# Patient Record
Sex: MALE | Race: ASIAN | NOT HISPANIC OR LATINO | Employment: OTHER | URBAN - METROPOLITAN AREA
[De-identification: names, ages, dates, MRNs, and addresses within clinical notes are randomized per-mention and may not be internally consistent; named-entity substitution may affect disease eponyms.]

---

## 2017-09-08 ENCOUNTER — TELEPHONE (OUTPATIENT)
Dept: GASTROENTEROLOGY | Facility: CLINIC | Age: 72
End: 2017-09-08

## 2017-10-31 NOTE — TELEPHONE ENCOUNTER
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 If yes, list:    Patient Contact (Y/N) & Call Details: No referred    Action: Faxed cover sheet      OUTSIDE RECORDS CHECKLIST     CLINIC NAME COMMENTS REC (x) IMG (x)   [facility]

## 2017-11-01 ENCOUNTER — PRE VISIT (OUTPATIENT)
Dept: NEUROLOGY | Facility: CLINIC | Age: 72
End: 2017-11-01

## 2017-11-01 ENCOUNTER — OFFICE VISIT (OUTPATIENT)
Dept: NEUROLOGY | Facility: CLINIC | Age: 72
End: 2017-11-01

## 2017-11-01 VITALS
HEART RATE: 100 BPM | WEIGHT: 133 LBS | HEIGHT: 60 IN | SYSTOLIC BLOOD PRESSURE: 125 MMHG | BODY MASS INDEX: 26.11 KG/M2 | DIASTOLIC BLOOD PRESSURE: 71 MMHG

## 2017-11-01 DIAGNOSIS — M48.062 SPINAL STENOSIS OF LUMBAR REGION WITH NEUROGENIC CLAUDICATION: Primary | ICD-10-CM

## 2017-11-01 RX ORDER — SENNOSIDES 8.6 MG
1 TABLET ORAL DAILY
COMMUNITY

## 2017-11-01 ASSESSMENT — PAIN SCALES - GENERAL: PAINLEVEL: MODERATE PAIN (4)

## 2017-11-01 NOTE — MR AVS SNAPSHOT
After Visit Summary   11/1/2017    Layla Alcaraz    MRN: [id number]           Patient Information     Date Of Birth          1945        Visit Information        Provider Department      11/1/2017 2:00 PM Genna Stein; Germania Mancuso MD [facility] Neurology        Today's Diagnoses     Spinal stenosis of lumbar region with neurogenic claudication    -  1      Care Instructions    Schedule EMG, Physiatry and Neurosurgery appointments.    Continue gabapentin as prescribed by PCP.    Follow up in Neurology as needed.          Follow-ups after your visit        Additional Services     NEUROSURGERY REFERRAL       Your provider has referred you to: [facility], [place]: Neurosurgery Clinic [facility] [phone]   [email or web address]    Please be aware that coverage of these services is subject to the terms and limitations of your health insurance plan.  Call member services at your health plan with any benefit or coverage questions.      Please bring the following with you to your appointment:    (1) Any X-Rays, CTs or MRIs which have been performed.  Contact the facility where they were done to arrange for  prior to your scheduled appointment.   (2) List of current medications  (3) This referral request   (4) Any documents/labs given to you for this referral            PHYSIATRY REFERRAL       Your provider has referred you to: [facility] [place]: Physical Medicine and Rehabilitation [facility] [phone]   [email or web address]     Please be aware that coverage of these services is subject to the terms and limitations of your health insurance plan.  Call member services at your health plan with any benefit or coverage questions.      Please bring the following to your appointment:  >>   Any x-rays, CTs or MRIs which have been performed.  Contact the facility where they were done to arrange for  prior to your scheduled appointment.    >>   List  of current medications   >>   This referral request   >>   Any documents/labs given to you for this referral                  Your next 10 appointments already scheduled     Dec 05, 2017  2:30 PM CST   (Arrive by 2:15 PM)   EMG with Joseph Melissa MD   [facility] EMG ([facility])    [address] [place] [address] [phone]           Do not use lotions or creams on the area to be tested. If you are on blood thinners (Warfarin, Coumadin, Lovenox, etc), please contact your primary care physician to check if it is safe to stop them 3 days prior to testing. If you have anxiety, please check with your referring physician to obtain anti-anxiety medication for the procedure.            Dec 11, 2017  1:30 PM CST   (Arrive by 1:15 PM)   New Patient Visit with Deepika Carlson PA-C   [facility] Neurosurgery ([facility])    [address] [place] [address] [phone]              Future tests that were ordered for you today     Open Future Orders        Priority Expected Expires Ordered    EMG Routine  11/1/2018 11/1/2017            Who to contact     Please call your clinic at [phone] to:    Ask questions about your health    Make or cancel appointments    Discuss your medicines    Learn about your test results    Speak to your doctor   If you have compliments or concerns about an experience at your clinic, or if you wish to file a complaint, please contact [facility] Patient Relations at [phone] or email us at [email or web address]         Additional Information About Your Visit        [employer] Information     [employer] is an electronic gateway that provides easy, online access to your medical records. With [employer], you can request a clinic appointment, read your test results, renew a prescription or communicate with your care team.     To sign up for [employer]  "visit the website at [email or web address]   You will be asked to enter the access code listed below, as well as some personal information. Please follow the directions to create your username and password.     Your access code is: [email or web address]  Expires: 2017  6:30 AM     Your access code will  in 90 days. If you need help or a new code, please contact your [facility] or call [phone] for assistance.        Care EveryWhere ID     This is your Care EveryWhere ID. This could be used by other organizations to access your [place] medical records  [id number]        Your Vitals Were     Pulse Height BMI (Body Mass Index)             100 1.499 m (4' 11\") 26.86 kg/m2          Blood Pressure from Last 3 Encounters:   17 125/71    Weight from Last 3 Encounters:   17 60.3 kg (133 lb)   10/04/16 61.7 kg (136 lb)              We Performed the Following     NEUROSURGERY REFERRAL     PHYSIATRY REFERRAL        Primary Care Provider Office Phone # Fax #    Emani Torres [phone]       [facility]  [facility] [address]        Equal Access to Services     CISCO LEONG : Hadii aad ku hadasho Soomaali, waaxda luqadaha, qaybta kaalmada adeegyada, waxay idiin haykimberlyn luisana guerrier. So [facility] [phone].    ATENCIÓN: Si habla español, tiene a marinelli disposición servicios gratuitos de asistencia lingüística. Llame al [phone].    We comply with applicable federal civil rights laws and [place] laws. We do not discriminate on the basis of race, color, national origin, age, disability, sex, sexual orientation, or gender identity.            Thank you!     Thank you for choosing [facility] NEUROLOGY  for your care. Our goal is always to provide you with excellent care. Hearing back from our patients is one way we can continue to improve our services. Please take a few minutes to complete the written survey " that you may receive in the mail after your visit with us. Thank you!             Your Updated Medication List - Protect others around you: Learn how to safely use, store and throw away your medicines at www.disposemymeds.org.          This list is accurate as of: 11/1/17  3:59 PM.  Always use your most recent med list.                   Brand Name Dispense Instructions for use Diagnosis    ASPIRIN PO      Take 81 mg by mouth        calcium-vitamin D 600-400 MG-UNIT per tablet    CALTRATE     Take 1 tablet by mouth 2 times daily        FLUOXETINE HCL PO      Take 40 mg by mouth daily        GABAPENTIN PO      Take 800 mg by mouth 4 times daily        PRAVASTATIN SODIUM PO           sennosides 8.6 MG tablet    SENOKOT     Take 1 tablet by mouth daily
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
## 2017-11-01 NOTE — PATIENT INSTRUCTIONS
Schedule EMG, Physiatry and Neurosurgery appointments.    Continue gabapentin as prescribed by PCP.    Follow up in Neurology as needed.

## 2017-11-01 NOTE — NURSING NOTE
Chief Complaint   Patient presents with     Consult     Paralysis in arms and legs    Tosha Carranza, TONG

## 2017-12-05 ENCOUNTER — OFFICE VISIT (OUTPATIENT)
Dept: NEUROLOGY | Facility: CLINIC | Age: 72
End: 2017-12-05
Attending: PSYCHIATRY & NEUROLOGY

## 2017-12-05 DIAGNOSIS — M54.17 LUMBOSACRAL RADICULOPATHY AT L5: Primary | ICD-10-CM

## 2017-12-05 DIAGNOSIS — M48.062 SPINAL STENOSIS OF LUMBAR REGION WITH NEUROGENIC CLAUDICATION: ICD-10-CM

## 2017-12-05 NOTE — PROGRESS NOTES
[facility]  Electrodiagnostic Laboratory    Nerve Conduction & EMG Report          Patient: Layla Alcaraz  Patient ID: [id number]  Gender: Male  YOB: 1945  Age: 72 Years 6 Months      Referring Physician: Daniel Wilks MD  CC: Germania Mancuso MD    History & Examination:    72 year old man with chief complaint of pain radiating from the left buttock and hip to the lateral calf and dorsum of the foot. He has an occasional burning sensation at the left foot, but no complaints on the right. Query left lumbosacral radiculopathy or other neuromuscular explanation for the chief complaint.     Techniques: Motor and sensory conduction studies were done with surface recording electrodes. Temperature was monitored and recorded throughout the study. Lower extremities were maintained at a temperature of 31degrees Centigrade or higher. EMG was done with a concentric needle electrode.     Results:    Left sural and superficial peroneal antidromic sensory NCSs were normal. Left deep peroneal and tibial motor NCSs were normal. Left tibial F-wave latencies were normal. EMG of left tibialis anterior, medial gastrocnemius, vastus lateralis, biceps femoris (short head) and gluteus medius was normal. EMG of left L5 paraspinals showed 2+ fibs/PSWs.    Interpretation:    Abnormal study. There was abnormal spontaneous activity at the left lumbar paraspinals. This finding suggests left lumbosacral radiculopathy in the appropriate clinical context, but a) it is not sufficient to make this electrodiagnosis, when isolated, b) it does not allow accurate localization of the level affected, and c) it is not specific to radiculopathy, as similar findings can be seen in axial myopathy, local trauma, or even asymptomatic, healthy individuals over the age of 60. Clinical correlation is required.     EMG Physician:    Joseph Melissa MD        Sensory NCS      Nerve / Sites Rec. Site Onset Peak NP Amp Ref. PP Amp  Dist Mao Ref. Temp     ms ms  V  V  V cm m/s m/s  C   L SURAL - Lat Mall 60      Calf Ankle 3.02 3.85 12.3 5.0 13.5 14 46.3 38.0 30.7   L SUP PERONEAL      Lat Leg Austin 2.92 3.65 8.3  9.2 12.5 42.9 38.0 31.1       Motor NCS      Nerve / Sites Rec. Site Lat Ref. Amp Ref. Rel Amp Dist Mao Ref. Dur. Area Temp.     ms ms mV mV % cm m/s m/s ms %  C   L DEEP PERONEAL - EDB 60      Ankle EDB 5.26 6.00 4.3 2.0 100 8   6.93 100 31      FibHead EDB 10.78  4.0  94.6 24 43.5 38.0 7.24 90.3 31      Pop Fos EDB 12.45  4.1  96.1 8 48.0 38.0 6.93 93.1 31   L TIBIAL - AH      Ankle AH 3.91 6.00 16.3 4.0 100 8   6.30 100 31      Pop Fos AH 10.83  13.3  81.6 33 47.6 38.0 7.03 94 31       F  Wave      Nerve Min F Lat Max F Lat Mean FLat Temp.    ms ms ms  C   L TIBIAL 41.56 48.91 46.91 31       EMG Summary Table     Spontaneous MUAP Recruitment    IA Fib Fasc H.F. Amp Dur. PPP Pattern   L. TIB ANTERIOR Normal None None None N N None Normal   L. GASTROCN (MED) Normal None None None N N None Normal   L. VAST LATERALIS Normal None None None N N None Normal   L. BIC FEM (S HEAD) Normal None None None N N None Normal   L. GLUTEUS MED Normal None None None N N None Normal   L. L5 PSP Normal 2+ None None N N None Normal

## 2017-12-05 NOTE — MR AVS SNAPSHOT
After Visit Summary   12/5/2017    Layla Alcaraz    MRN: [id number]           Patient Information     Date Of Birth          1945        Visit Information        Provider Department      12/5/2017 2:15 PM Joseph Melissa MD; MULTILINGUAL WORD [facility] EMG        Today's Diagnoses     Lumbosacral radiculopathy at L5    -  1    Spinal stenosis of lumbar region with neurogenic claudication           Follow-ups after your visit        Your next 10 appointments already scheduled     Dec 11, 2017  1:30 PM CST   (Arrive by 1:15 PM)   New Patient Visit with Deepika Carlson PA-C   [facility] Neurosurgery ([facility])    [address] [place] [address]  3rd [facility] [address] [phone]              Future tests that were ordered for you today     Open Future Orders        Priority Expected Expires Ordered    Needle EMG Each Extremity w/Paraspinal Area Complete (71734) Routine  12/5/2018 12/5/2017            Who to contact     Please call your clinic at [phone] to:    Ask questions about your health    Make or cancel appointments    Discuss your medicines    Learn about your test results    Speak to your doctor   If you have compliments or concerns about an experience at your clinic, or if you wish to file a complaint, please contact [facility] Patient Relations at [phone] or email us at [email or web address]         Additional Information About Your Visit        MyChart Information     [employer] is an electronic gateway that provides easy, online access to your medical records. With [employer], you can request a clinic appointment, read your test results, renew a prescription or communicate with your care team.     To sign up for [employer] visit the website at [email or web address]   You will be asked to enter the access code listed below, as well as some personal information. Please follow the directions  to create your username and password.     Your access code is: [email or web address]  Expires: 2017  5:30 AM     Your access code will  in 90 days. If you need help or a new code, please contact your [facility] or call [phone] for assistance.        Care EveryWhere ID     This is your Care EveryWhere ID. This could be used by other organizations to access your [place] medical records  [id number]         Blood Pressure from Last 3 Encounters:   17 125/71    Weight from Last 3 Encounters:   17 60.3 kg (133 lb)   10/04/16 61.7 kg (136 lb)              We Performed the Following     EMG     NCS Motor with or without F-Wave, 3-4 nerves (61080)        Primary Care Provider Office Phone # Fax #    Emani Torres [phone]       [facility]  [facility] [address]        Equal Access to Services     CISCO LEONG : Hadii aad ku hadasho Soomaali, waaxda luqadaha, qaybta kaalmada adeegyada, waxay idiin hayaan adeeg kharaanny drew . So [facility] [phone].    ATENCIÓN: Si habla español, tiene a marinelli disposición servicios gratuitos de asistencia lingüística. Ll[facility] [phone].    We comply with applicable federal civil rights laws and [place] laws. We do not discriminate on the basis of race, color, national origin, age, disability, sex, sexual orientation, or gender identity.            Thank you!     Thank you for choosing [facility]  for your care. Our goal is always to provide you with excellent care. Hearing back from our patients is one way we can continue to improve our services. Please take a few minutes to complete the written survey that you may receive in the mail after your visit with us. Thank you!             Your Updated Medication List - Protect others around you: Learn how to safely use, store and throw away your medicines at www.disposemymeds.org.          This list is accurate as of: 17   3:04 PM.  Always use your most recent med list.                   Brand Name Dispense Instructions for use Diagnosis    ASPIRIN PO      Take 81 mg by mouth        calcium-vitamin D 600-400 MG-UNIT per tablet    CALTRATE     Take 1 tablet by mouth 2 times daily        FLUOXETINE HCL PO      Take 40 mg by mouth daily        GABAPENTIN PO      Take 800 mg by mouth 4 times daily        PRAVASTATIN SODIUM PO           sennosides 8.6 MG tablet    SENOKOT     Take 1 tablet by mouth daily

## 2017-12-05 NOTE — LETTER
12/5/2017       RE: Layla Alcaraz  [address]        [facility] [place] [address]     Dear Colleague,    Thank you for referring your patient, Layla Alcaraz, to the [facility] EMG at [facility]. Please see a copy of my visit note below.        [facility]  Electrodiagnostic Laboratory    Nerve Conduction & EMG Report          Patient: Layla Alcaraz  Patient ID: [id number]  Gender: Male  YOB: 1945  Age: 72 Years 6 Months      Referring Physician: Daniel Wilks MD  CC: Germania Mancuso MD    History & Examination:    72 year old man with chief complaint of pain radiating from the left buttock and hip to the lateral calf and dorsum of the foot. He has an occasional burning sensation at the left foot, but no complaints on the right. Query left lumbosacral radiculopathy or other neuromuscular explanation for the chief complaint.     Techniques: Motor and sensory conduction studies were done with surface recording electrodes. Temperature was monitored and recorded throughout the study. Lower extremities were maintained at a temperature of 31degrees Centigrade or higher. EMG was done with a concentric needle electrode.     Results:    Left sural and superficial peroneal antidromic sensory NCSs were normal. Left deep peroneal and tibial motor NCSs were normal. Left tibial F-wave latencies were normal. EMG of left tibialis anterior, medial gastrocnemius, vastus lateralis, biceps femoris (short head) and gluteus medius was normal. EMG of left L5 paraspinals showed 2+ fibs/PSWs.    Interpretation:    Abnormal study. There was abnormal spontaneous activity at the left lumbar paraspinals. This finding suggests left lumbosacral radiculopathy in the appropriate clinical context, but a) it is not sufficient to make this electrodiagnosis, when isolated, b) it does not allow accurate localization of the level affected, and c) it is not specific  to radiculopathy, as similar findings can be seen in axial myopathy, local trauma, or even asymptomatic, healthy individuals over the age of 60. Clinical correlation is required.     EMG Physician:    Joseph Meilssa MD        Sensory NCS      Nerve / Sites Rec. Site Onset Peak NP Amp Ref. PP Amp Dist Mao Ref. Temp     ms ms  V  V  V cm m/s m/s  C   L SURAL - Lat Mall 60      Calf Ankle 3.02 3.85 12.3 5.0 13.5 14 46.3 38.0 30.7   L SUP PERONEAL      Lat Leg Austin 2.92 3.65 8.3  9.2 12.5 42.9 38.0 31.1       Motor NCS      Nerve / Sites Rec. Site Lat Ref. Amp Ref. Rel Amp Dist Mao Ref. Dur. Area Temp.     ms ms mV mV % cm m/s m/s ms %  C   L DEEP PERONEAL - EDB 60      Ankle EDB 5.26 6.00 4.3 2.0 100 8   6.93 100 31      FibHead EDB 10.78  4.0  94.6 24 43.5 38.0 7.24 90.3 31      Pop Fos EDB 12.45  4.1  96.1 8 48.0 38.0 6.93 93.1 31   L TIBIAL - AH      Ankle AH 3.91 6.00 16.3 4.0 100 8   6.30 100 31      Pop Fos AH 10.83  13.3  81.6 33 47.6 38.0 7.03 94 31       F  Wave      Nerve Min F Lat Max F Lat Mean FLat Temp.    ms ms ms  C   L TIBIAL 41.56 48.91 46.91 31       EMG Summary Table     Spontaneous MUAP Recruitment    IA Fib Fasc H.F. Amp Dur. PPP Pattern   L. TIB ANTERIOR Normal None None None N N None Normal   L. GASTROCN (MED) Normal None None None N N None Normal   L. VAST LATERALIS Normal None None None N N None Normal   L. BIC FEM (S HEAD) Normal None None None N N None Normal   L. GLUTEUS MED Normal None None None N N None Normal   L. L5 PSP Normal 2+ None None N N None Normal                      Again, thank you for allowing me to participate in the care of your patient.      Sincerely,    Joseph Melissa MD

## 2017-12-11 ENCOUNTER — OFFICE VISIT (OUTPATIENT)
Dept: NEUROSURGERY | Facility: CLINIC | Age: 72
End: 2017-12-11
Attending: PSYCHIATRY & NEUROLOGY

## 2017-12-11 VITALS
HEIGHT: 60 IN | HEART RATE: 87 BPM | DIASTOLIC BLOOD PRESSURE: 74 MMHG | BODY MASS INDEX: 27.68 KG/M2 | SYSTOLIC BLOOD PRESSURE: 136 MMHG | WEIGHT: 141 LBS

## 2017-12-11 DIAGNOSIS — M47.812 SPONDYLOSIS OF CERVICAL REGION WITHOUT MYELOPATHY OR RADICULOPATHY: ICD-10-CM

## 2017-12-11 DIAGNOSIS — M47.816 LUMBAR SPONDYLOSIS: Primary | ICD-10-CM

## 2017-12-11 DIAGNOSIS — M47.814 THORACIC SPONDYLOSIS WITHOUT MYELOPATHY: ICD-10-CM

## 2017-12-11 ASSESSMENT — ENCOUNTER SYMPTOMS
NECK PAIN: 1
MUSCLE WEAKNESS: 0
STIFFNESS: 0
ARTHRALGIAS: 0
MYALGIAS: 1
BACK PAIN: 1
JOINT SWELLING: 0
MUSCLE CRAMPS: 0

## 2017-12-11 ASSESSMENT — PAIN SCALES - GENERAL: PAINLEVEL: NO PAIN (0)

## 2017-12-11 NOTE — LETTER
12/11/2017       RE: Layla Alcaraz  [address]        [facility] [address]     Dear Colleague,    Thank you for referring your patient, Layla Alcaraz, to the [facility] NEUROSURGERY at [facility]. Please see a copy of my visit note below.      Neurosurgery Clinic Consult  Date of Visit: 12/11/2017  Referred for: Left leg pain  Referring Provider:  Efe Sanchez MD      Dear Dr. Wilks    We were happy to see Layla Alcaraz, a pleasant 72 year old year old male for neck pain, low back pain, left leg pain.    HPI: This nice gentleman has had pain in his back, left leg and neck for years. In fact in 2010 he was evaluated even with an EMG for paresthesias and sensory changes in the left lower extremity. This EMG was negative. He's had some minor treatments over the years, but nothing within the last year or so with the exception of an updated MRI. He was referred to neurology initially because she had complaints of both left arm and left leg symptoms, but by the time he saw neurology his left arm symptoms were resolved. Currently he has back pain and left leg pain worse when he's sitting or lying down, particularly when lying on his side, but seemed to be better if he is up walking around. It is rather severe at times. He has been on gabapentin for greater than 2 years and doesn't notice that it makes too much difference. He thinks that helps a little even though he is on near maximum dose, about 3200 mg per day. He doesn't have any weakness, the pain in his leg is described as burning, or hot, but not numb. The pain in the left leg radiates from the buttock, posterolateral thigh and calf to the dorsum of the foot. Approximately an L5 distribution. He has axial neck pain, worse when he is moving around, no arm symptoms.    When he was seen by neurology he was found to have a little weakness in the left deltoid and psoas. Simultaneous PMR and  neurosurgery consults were ordered. Apparently his primary care clinic sets up his appointments for him. The neurosurgery appointment was set up before PMR for management of his symptoms, he is not interested in surgery if it can possibly be avoided. A treatment history outline is included below    Treatment history: (Within the last year)   SURGERIES: no  PT: No  ORAL STEROIDS: No  INJECTIONS: Last one was around the left knee helped for 2-3 years. Never had any in the spine.  PERTINENT MEDS:   Gabapentin, takes it t.i.d., usually eases his pain for about 2 hours after each dose.  NICOTINE:   no  PAIN CONTRACT:  No     Patient Supplied Answers To the [facility] Pain Questionnaire  [facility] Pain -  Patient Entered Questionnaire/Answers 12/11/2017   What number best describes your pain right now:  0 = No pain  to  10 = Worst pain imaginable 6   How would you describe the pain? burning, sharp   Which of the following worsen your pain? lying down, sitting   Which of the following improve or reduce your pain?  medication   What number best describes your average pain for the past week:  0 = No pain  to  10 = Worst pain imaginable 6   What number best describes your LOWEST pain in past 24 hours:  0 = No pain  to  10 = Worst pain imaginable 4   What number best describes your WORST pain in past 24 hours:  0 = No pain  to  10 = Worst pain imaginable 10   When is your pain worst? Night   Have you tried treating your pain with medication?  Yes   Are you currently taking medications for your pain? Yes   *  PAIN: Location:  radiating from the left buttock and hip lateral left thigh to the lateral calf and dorsum of the foot and burning sensation at the left foot.  Exacerbated by:  Laying down or sitting. Can walk as far as he likes  Alleviated by:  Gabapentin  *  NUMBNESS: Location:  No  *  WEAKNESS:  No weakness   *  BOWEL/BLADDER FUNCTION:  Intact.    *  OTHER SYMPTOMS:  None     Current Outpatient Prescriptions:      sennosides (SENOKOT)  "8.6 MG tablet, Take 1 tablet by mouth daily, Disp: , Rfl:      PRAVASTATIN SODIUM PO, , Disp: , Rfl:      calcium-vitamin D (CALTRATE) 600-400 MG-UNIT per tablet, Take 1 tablet by mouth 2 times daily, Disp: , Rfl:      FLUOXETINE HCL PO, Take 40 mg by mouth daily, Disp: , Rfl:      GABAPENTIN PO, Take 800 mg by mouth 4 times daily , Disp: , Rfl:      ASPIRIN PO, Take 81 mg by mouth , Disp: , Rfl:     No Known Allergies    Past Medical History:   Diagnosis Date     Hearing problem        No past surgical history on file.    No family history on file.    Social History     Social History     Marital status: Single     Spouse name: N/A     Number of children: N/A     Years of education: N/A     Occupational History     Not on file.     Social History Main Topics     Smoking status: Former Smoker     Packs/day: 1.00     Years: 30.00     Types: Cigarettes     Smokeless tobacco: Never Used     Alcohol use 0.0 oz/week     0 Standard drinks or equivalent per week      Comment: occasional     Drug use: Not on file     Sexual activity: Not on file     Other Topics Concern     Not on file     Social History Narrative     Problem list and 13 point review of systems: is reviewed in Epic and is negative with the exception of those symptoms associated with HPI and PMH.      OBJECTIVE:   /74  Pulse 87  Ht 1.499 m (4' 11\")  Wt 64 kg (141 lb)  BMI 28.48 kg/m2    Imaging and studies:  These are the pertinent radiologist's findings from:  Nerve Conduction and EMG 12/05/2017 @ [facility] .   Interpretation:  There was abnormal spontaneous activity at the left lumbar paraspinals. This finding suggests left lumbosacral radiculopathy in the appropriate clinical context, but a) it is not sufficient to make this electrodiagnosis, when isolated, b) it does not allow accurate localization of the level affected, and c) it is not specific to radiculopathy, as similar findings can be seen in axial myopathy, local trauma, or even asymptomatic, " healthy individuals over the age of 60.  MRI of Cervical, Thoracic and Lumbar Spine 09/17/2017 @ [facility]   Impressions: 09/17/2017  1. Multilevel cervical spondylosis most pronounced at C5-6 with  moderate neural foraminal narrowing bilaterally and moderate spinal  canal narrowing.  2. Multilevel thoracic spondylosis, most pronounced at T10-11 where  there is moderate spinal canal narrowing and moderate neural foraminal  narrowing bilaterally. Small focus of myelomalacia in the central  Cord at T10 -11   5. Small round focus of enhancement in the left posterior vertebral  body lower endplate of T10. Although this has slightly enlarged since  2015, this most likely represents a vascular Schmorl's node,  particularly absent a history of primary malignancy  3. Multilevel lumbar spondylosis with levoconvex scoliosis, most  pronounced at L4-5 with moderate left neural foraminal narrowing and  moderate to severe spinal canal stenosis.  4. Active inflammatory arthropathy involving the facet joints of the  lower lumbar spine and lower cervical spine.  See the full report in EPIC/Media tab.  I personally reviewed the images with the patient.      Exam:  Pertinent positives:    *   Well developed, well nourished male found seated comfortably in exam room chair.  He is able to sit and rise independently.  He is accompanied by .    *  Mental Status  A&O X3.  Bright, alert, affable, interactive. Language fluid, fund of knowledge intact. Good historian.  Mood and affect congruent and WNL..   *  Cranial Nerves  II: Able to read printed forms, VF full to gross confrontation.  III: IV, VI:  PERRLA, EOMI, No nystagmus, no ptosis.  V: Sensation intact in bilateral V1, V2, and V3. Jaw clench symmetric.    VII:  Intact to voice bilaterally.  IX:  Pushes tongue against bilateral cheeks.  X:  Palate elevates, uvula midline, phonation intact.  XI: Elevates shoulders, head turn intact.  XII: Tongue midline. No fasciculations.  *   Cervical Spine:  DTR's at Biceps, Triceps, and Brachioradialis 2/4 and symmetric.  Sensation intact.    No Marin's. No Phalen's.  No Tinel's. No fasciculations.   Muscle bulk and tone WNL.  Upper Extremity Strength.              RIGHT                LEFT     Deltoid              5/5                   5/5       Biceps              5/5                   5/5        Triceps              5/5                   5/5       Wrist Extensor              5/5                   5/5                     5/5                    5/5       Interossei              5/5                   5/5       EPL              5/5                    5/5       Pinch              5/5                  5/5          *  Lumbar Spine:    DTR's Patellar and Achilles 1/4 and symmetric.   No fasciculations.  Muscle bulk and tone WNL.  No Clonus.  Sensation intact.    Lower Extremity Strength                   RIGHT                   LEFT     Iliopsoas                    5/5                      5/5       Quad                    5/5                        5/5       Hamstring                      5/5                        5/5         Gastrocs                    5/5                        5/5       Tib. Anterior                     5/5                        5/5       EHL                      5/5                        5/5       Babinski                 Down                                      Down                   *  Structural Exam  Inspection of the spine reveals no scoliosis, exaggerated kyphosis or lordosis. Head is balanced over the pelvis in the coronal and sagittal plane.    Cervical spine ROM full. No spasming. No tenderness to palpation.  No Spurling's or L'Hermitte's.   Lumbar ROM full.  . No spasming, no tenderness, no step offs. No SLR.  No SI tenderness. No trochanteric bursal tenderness. Negative Joan's.    Gait narrow, smooth, no scissoring. No antalgia. Tandem gait intact.        ASSESSMENT/PLAN:  No diagnosis found.    This gentleman has  multiple levels of spinal spondylosis. Beginning with the presenting complaints, low back pain and left leg pain. His most severe level of spondylosis is L4 5 where he has a little central stenosis, but really quite tight left L4 5 stenosis. I believe he would do well with physical therapy, a left L4 5 transforaminal DARWIN, and referral to PMR for ongoing management    Regarding the thoracic spine he has multiple levels of spondylosis, the most severe at T10 11 where she has a little central stenosis, not severe, but there is an area of myelomalacia in the cord. I suspect this is an old injury. He has no long track signs in the lower extremities, no briskness of the reflexes, no weakness, no symptoms of spinal cord dysfunction. As such I question what benefit surgery would offer since he has no symptoms now. You could make the argument that he may be at increased risk for reinjury, but this is structural thoracic spine, and that reinjury risk is very low.  I will discuss this with one of the complex spine surgeons and get their opinion on this but at this time I have made a recommendation for surgery.    He has cervical spondylosis and listhesis. I recommended AP and lateral cervical films with lateral flexion extensions to evaluate the amount of motion he has. He describes no Spurling's nor L'Hermitte's signs so I suspect the amount of motion he has is minimal. The majority of his symptoms are axial neck pain, for which surgical intervention is limited to fusion only. And we prefer not to do a large surgery like that unless all nonoperative management efforts have failed. He hasn't tried any nonoperative management for his neck, so I think a referral to physical therapy, home cervical traction unit, and a referral to PMR would be useful for him for ongoing management.  I answered all of his questions which indicate a good understanding of the situation. He is willing to move forward with these recommendations. I  supplied him with business cards and telephone numbers so that she can call if anything else comes up.   It's been a pleasure participating in the care of this nice patient. We thank you for your confidence in the [facility], Department of Neurosurgery.      Best Regards,    Deepika Carlson PA-C  [facility]  Department of Neurosurgery  Phone: [phone]  Fax: [phone]        Total time: 60 minutes with more than 30 minutes spent in direct face to face contact reviewing films, providing education, counseling, non-operative therapies,and indications for surgery as well as further follow up.    This note was generated using voice recognition software. While edited for content some inaccurate phrasing may be found.    Again, thank you for allowing me to participate in the care of your patient.      Sincerely,    Deepika Carlson PA-C

## 2017-12-11 NOTE — PROGRESS NOTES
Neurosurgery Clinic Consult  Date of Visit: 12/11/2017  Referred for: Left leg pain  Referring Provider:  Efe Sanchez MD      Dear Dr. Wilks    We were happy to see Layla Alcaraz, a pleasant 72 year old year old male for neck pain, low back pain, left leg pain.    HPI: This nice gentleman has had pain in his back, left leg and neck for years. In fact in 2010 he was evaluated even with an EMG for paresthesias and sensory changes in the left lower extremity. This EMG was negative. He's had some minor treatments over the years, but nothing within the last year or so with the exception of an updated MRI. He was referred to neurology initially because she had complaints of both left arm and left leg symptoms, but by the time he saw neurology his left arm symptoms were resolved. Currently he has back pain and left leg pain worse when he's sitting or lying down, particularly when lying on his side, but seemed to be better if he is up walking around. It is rather severe at times. He has been on gabapentin for greater than 2 years and doesn't notice that it makes too much difference. He thinks that helps a little even though he is on near maximum dose, about 3200 mg per day. He doesn't have any weakness, the pain in his leg is described as burning, or hot, but not numb. The pain in the left leg radiates from the buttock, posterolateral thigh and calf to the dorsum of the foot. Approximately an L5 distribution. He has axial neck pain, worse when he is moving around, no arm symptoms.    When he was seen by neurology he was found to have a little weakness in the left deltoid and psoas. Simultaneous PMR and neurosurgery consults were ordered. Apparently his primary care clinic sets up his appointments for him. The neurosurgery appointment was set up before PMR for management of his symptoms, he is not interested in surgery if it can possibly be avoided. A treatment history outline is included below    Treatment  history: (Within the last year)   SURGERIES: no  PT: No  ORAL STEROIDS: No  INJECTIONS: Last one was around the left knee helped for 2-3 years. Never had any in the spine.  PERTINENT MEDS:   Gabapentin, takes it t.i.d., usually eases his pain for about 2 hours after each dose.  NICOTINE:   no  PAIN CONTRACT:  No     Patient Supplied Answers To the  Pain Questionnaire  [facility] -  Patient Entered Questionnaire/Answers 12/11/2017   What number best describes your pain right now:  0 = No pain  to  10 = Worst pain imaginable 6   How would you describe the pain? burning, sharp   Which of the following worsen your pain? lying down, sitting   Which of the following improve or reduce your pain?  medication   What number best describes your average pain for the past week:  0 = No pain  to  10 = Worst pain imaginable 6   What number best describes your LOWEST pain in past 24 hours:  0 = No pain  to  10 = Worst pain imaginable 4   What number best describes your WORST pain in past 24 hours:  0 = No pain  to  10 = Worst pain imaginable 10   When is your pain worst? Night   Have you tried treating your pain with medication?  Yes   Are you currently taking medications for your pain? Yes   *  PAIN: Location:  radiating from the left buttock and hip lateral left thigh to the lateral calf and dorsum of the foot and burning sensation at the left foot.  Exacerbated by:  Laying down or sitting. Can walk as far as he likes  Alleviated by:  Gabapentin  *  NUMBNESS: Location:  No  *  WEAKNESS:  No weakness   *  BOWEL/BLADDER FUNCTION:  Intact.    *  OTHER SYMPTOMS:  None     Current Outpatient Prescriptions:      sennosides (SENOKOT) 8.6 MG tablet, Take 1 tablet by mouth daily, Disp: , Rfl:      PRAVASTATIN SODIUM PO, , Disp: , Rfl:      calcium-vitamin D (CALTRATE) 600-400 MG-UNIT per tablet, Take 1 tablet by mouth 2 times daily, Disp: , Rfl:      FLUOXETINE HCL PO, Take 40 mg by mouth daily, Disp: , Rfl:      GABAPENTIN PO, Take 800 mg  "by mouth 4 times daily , Disp: , Rfl:      ASPIRIN PO, Take 81 mg by mouth , Disp: , Rfl:     No Known Allergies    Past Medical History:   Diagnosis Date     Hearing problem        History reviewed. No pertinent surgical history.    History reviewed. No pertinent family history.    Social History     Social History     Marital status: Single     Spouse name: N/A     Number of children: N/A     Years of education: N/A     Occupational History     Not on file.     Social History Main Topics     Smoking status: Former Smoker     Packs/day: 1.00     Years: 30.00     Types: Cigarettes     Smokeless tobacco: Never Used     Alcohol use 0.0 oz/week     0 Standard drinks or equivalent per week      Comment: occasional     Drug use: Not on file     Sexual activity: Not on file     Other Topics Concern     Not on file     Social History Narrative     Problem list and 13 point review of systems: is reviewed in Epic and is negative with the exception of those symptoms associated with HPI and PMH.      OBJECTIVE:   /74  Pulse 87  Ht 1.499 m (4' 11\")  Wt 64 kg (141 lb)  BMI 28.48 kg/m2    Imaging and studies:  These are the pertinent radiologist's findings from:  Nerve Conduction and EMG 12/05/2017 @ [facility] .   Interpretation:  There was abnormal spontaneous activity at the left lumbar paraspinals. This finding suggests left lumbosacral radiculopathy in the appropriate clinical context, but a) it is not sufficient to make this electrodiagnosis, when isolated, b) it does not allow accurate localization of the level affected, and c) it is not specific to radiculopathy, as similar findings can be seen in axial myopathy, local trauma, or even asymptomatic, healthy individuals over the age of 60.  MRI of Cervical, Thoracic and Lumbar Spine 09/17/2017 @ [facility]   Impressions: 09/17/2017  1. Multilevel cervical spondylosis most pronounced at C5-6 with  moderate neural foraminal narrowing bilaterally and moderate spinal  canal " narrowing.  2. Multilevel thoracic spondylosis, most pronounced at T10-11 where  there is moderate spinal canal narrowing and moderate neural foraminal  narrowing bilaterally. Small focus of myelomalacia in the central  Cord at T10 -11   5. Small round focus of enhancement in the left posterior vertebral  body lower endplate of T10. Although this has slightly enlarged since  2015, this most likely represents a vascular Schmorl's node,  particularly absent a history of primary malignancy  3. Multilevel lumbar spondylosis with levoconvex scoliosis, most  pronounced at L4-5 with moderate left neural foraminal narrowing and  moderate to severe spinal canal stenosis.  4. Active inflammatory arthropathy involving the facet joints of the  lower lumbar spine and lower cervical spine.  See the full report in EPIC/Media tab.  I personally reviewed the images with the patient.      Exam:  Pertinent positives:    *   Well developed, well nourished male found seated comfortably in exam room chair.  He is able to sit and rise independently.  He is accompanied by .    *  Mental Status  A&O X3.  Bright, alert, affable, interactive. Language fluid, fund of knowledge intact. Good historian.  Mood and affect congruent and WNL..   *  Cranial Nerves  II: Able to read printed forms, VF full to gross confrontation.  III: IV, VI:  PERRLA, EOMI, No nystagmus, no ptosis.  V: Sensation intact in bilateral V1, V2, and V3. Jaw clench symmetric.    VII:  Intact to voice bilaterally.  IX:  Pushes tongue against bilateral cheeks.  X:  Palate elevates, uvula midline, phonation intact.  XI: Elevates shoulders, head turn intact.  XII: Tongue midline. No fasciculations.  *  Cervical Spine:  DTR's at Biceps, Triceps, and Brachioradialis 2/4 and symmetric.  Sensation intact.    No Marin's. No Phalen's.  No Tinel's. No fasciculations.   Muscle bulk and tone WNL.  Upper Extremity Strength.              RIGHT                LEFT     Deltoid               5/5                   5/5       Biceps              5/5 5/5        Triceps              5/5                   5/5       Wrist Extensor              5/5 5/5                     5/5 5/5       Interossei              5/5 5/5       EPL              5/5 5/5       Pinch              5/5 5/5          *  Lumbar Spine:    DTR's Patellar and Achilles 1/4 and symmetric.   No fasciculations.  Muscle bulk and tone WNL.  No Clonus.  Sensation intact.    Lower Extremity Strength                   RIGHT                   LEFT     Iliopsoas                    5/5 5/5       Quad                    5/5 5/5       Hamstring                      5/5 5/5         Gastrocs                    5/5 5/5       Tib. Anterior                     5/5 5/5       EHL                      5/5 5/5       Babinski                 Down                                      Down                   *  Structural Exam  Inspection of the spine reveals no scoliosis, exaggerated kyphosis or lordosis. Head is balanced over the pelvis in the coronal and sagittal plane.    Cervical spine ROM full. No spasming. No tenderness to palpation.  No Spurling's or L'Hermitte's.   Lumbar ROM full.  . No spasming, no tenderness, no step offs. No SLR.  No SI tenderness. No trochanteric bursal tenderness. Negative Joan's.    Gait narrow, smooth, no scissoring. No antalgia. Tandem gait intact.        ASSESSMENT/PLAN:  1. Lumbar spondylosis    2. Spondylosis of cervical region without myelopathy or radiculopathy    3. Thoracic spondylosis without myelopathy        This gentleman has multiple levels of spinal spondylosis. Beginning with the presenting complaints, low back pain and left leg pain. His most severe level of  spondylosis is L4 5 where he has a little central stenosis, but really quite tight left L4 5 stenosis. I believe he would do well with physical therapy, a left L4 5 transforaminal DARWIN, and referral to PMR for ongoing management    Regarding the thoracic spine he has multiple levels of spondylosis, the most severe at T10 11 where she has a little central stenosis, not severe, but there is an area of myelomalacia in the cord. I suspect this is an old injury. He has no long track signs in the lower extremities, no briskness of the reflexes, no weakness, no symptoms of spinal cord dysfunction. As such I question what benefit surgery would offer since he has no symptoms now. You could make the argument that he may be at increased risk for reinjury, but this is structural thoracic spine, and that reinjury risk is very low.  I will discuss this with one of the complex spine surgeons and get their opinion on this but at this time I have made a recommendation for surgery.    He has cervical spondylosis and listhesis. I recommended AP and lateral cervical films with lateral flexion extensions to evaluate the amount of motion he has. He describes no Spurling's nor L'Hermitte's signs so I suspect the amount of motion he has is minimal. The majority of his symptoms are axial neck pain, for which surgical intervention is limited to fusion only. And we prefer not to do a large surgery like that unless all nonoperative management efforts have failed. He hasn't tried any nonoperative management for his neck, so I think a referral to physical therapy, home cervical traction unit, and a referral to PMR would be useful for him for ongoing management.  I answered all of his questions which indicate a good understanding of the situation. He is willing to move forward with these recommendations. I supplied him with business cards and telephone numbers so that she can call if anything else comes up.   It's been a pleasure participating in  the care of this nice patient. We thank you for your confidence in the [facility], Department of Neurosurgery.      12/14/17 Addendum I have reviewed the images and the case with Dr. Chelsea Buenrostro. She believes she does have lumbar spondylosis, responsible probably for his left leg pain. She noted she has a full bladder on his MRI, and questioned urinary retention.    She noted the myelomalacia in the thoracic spine and while he has no overt symptoms of spinal cord dysfunction question if he has urinary dysfunction.    She noted the cervical spondylosis and listhesis, the translation seen on flexion-extension views is noted.    She recommends further evaluation with the following a CTA head and neck, and open mouth odontoid view, full spine standing scoliosis x-rays, a post void bladder ultrasound to evaluate for residuals, and return to her clinic. I will arrange all of these things.  db           Best Regards,    Deepika Carlson PA-C  [facility]  Department of Neurosurgery  Phone: [phone]  Fax: [phone]        Total time: 60 minutes with more than 30 minutes spent in direct face to face contact reviewing films, providing education, counseling, non-operative therapies,and indications for surgery as well as further follow up.    This note was generated using voice recognition software. While edited for content some inaccurate phrasing may be found.

## 2017-12-11 NOTE — MR AVS SNAPSHOT
After Visit Summary   12/11/2017    Layla Alcaraz    MRN: [id number]           Patient Information     Date Of Birth          1945        Visit Information        Provider Department      12/11/2017 1:15 PM Deepika Carlson PA-C; [place] [employer] [facility] Neurosurgery        Today's Diagnoses     Lumbar spondylosis    -  1    Spondylosis of cervical region without myelopathy or radiculopathy           Follow-ups after your visit        Your next 10 appointments already scheduled     Dec 11, 2017  3:45 PM CST   XR CERVICAL SPINE G/E 4 VIEWS with UCXR1   [facility] ([facility])    [address]  1st Floor  [facility] [address] [phone]           Please bring a list of your current medicines to your exam. (Include vitamins, minerals and over-thecounter medicines.) Leave your valuables at home.  Tell your doctor if there is a chance you may be pregnant.  You do not need to do anything special for this exam.              Who to contact     Please call your clinic at [phone] to:    Ask questions about your health    Make or cancel appointments    Discuss your medicines    Learn about your test results    Speak to your doctor   If you have compliments or concerns about an experience at your clinic, or if you wish to file a complaint, please contact [facility] Patient Relations at [phone] or email us at [email or web address]         Additional Information About Your Visit        MyChart Information     [employer] is an electronic gateway that provides easy, online access to your medical records. With [employer], you can request a clinic appointment, read your test results, renew a prescription or communicate with your care team.     To sign up for [employer] visit the website at [email or web address]   You will be asked to enter the access code listed below, as well as some  "personal information. Please follow the directions to create your username and password.     Your access code is: [email or web address]  Expires: 2017  5:30 AM     Your access code will  in 90 days. If you need help or a new code, please contact your [facility] or call [phone] for assistance.        Care EveryWhere ID     This is your Care EveryWhere ID. This could be used by other organizations to access your [facility] medical records  [id number]        Your Vitals Were     Pulse Height BMI (Body Mass Index)             87 1.499 m (4' 11\") 28.48 kg/m2          Blood Pressure from Last 3 Encounters:   17 136/74   17 125/71    Weight from Last 3 Encounters:   17 64 kg (141 lb)   17 60.3 kg (133 lb)   10/04/16 61.7 kg (136 lb)              We Performed the Following     X-ray Cervical spine 4 views (AP, lateral, flexion, extension)  [[id number]]        Primary Care Provider Office Phone # Fax #    Emani Torres [phone]       [facility] CARE  [facility] [address]        Equal Access to Services     CISCO LEONG : Hadii aad ku hadasho Soomaali, waaxda luqadaha, qaybta kaalmada adeegyada, waxay prakash haykimberlyn luisana guerrier. So [facility] [phone].    ATENCIÓN: Si habla español, tiene a marinelli disposición servicios gratuitos de asistencia lingüística. ame al [phone].    We comply with applicable federal civil rights laws and [place] laws. We do not discriminate on the basis of race, color, national origin, age, disability, sex, sexual orientation, or gender identity.            Thank you!     Thank you for choosing [facility]  for your care. Our goal is always to provide you with excellent care. Hearing back from our patients is one way we can continue to improve our services. Please take a few minutes to complete the written survey that you may receive in the mail after your visit " with us. Thank you!             Your Updated Medication List - Protect others around you: Learn how to safely use, store and throw away your medicines at www.disposemymeds.org.          This list is accurate as of: 12/11/17  3:31 PM.  Always use your most recent med list.                   Brand Name Dispense Instructions for use Diagnosis    ASPIRIN PO      Take 81 mg by mouth        calcium-vitamin D 600-400 MG-UNIT per tablet    CALTRATE     Take 1 tablet by mouth 2 times daily        FLUOXETINE HCL PO      Take 40 mg by mouth daily        GABAPENTIN PO      Take 800 mg by mouth 4 times daily        PRAVASTATIN SODIUM PO           sennosides 8.6 MG tablet    SENOKOT     Take 1 tablet by mouth daily

## 2018-01-04 ENCOUNTER — RADIANT APPOINTMENT (OUTPATIENT)
Dept: GENERAL RADIOLOGY | Facility: CLINIC | Age: 73
End: 2018-01-04
Attending: PHYSICIAN ASSISTANT
Payer: COMMERCIAL

## 2018-01-04 ENCOUNTER — RADIANT APPOINTMENT (OUTPATIENT)
Dept: ULTRASOUND IMAGING | Facility: CLINIC | Age: 73
End: 2018-01-04
Attending: PHYSICIAN ASSISTANT
Payer: COMMERCIAL

## 2018-01-04 ENCOUNTER — RADIANT APPOINTMENT (OUTPATIENT)
Dept: CT IMAGING | Facility: CLINIC | Age: 73
End: 2018-01-04
Attending: PHYSICIAN ASSISTANT
Payer: COMMERCIAL

## 2018-01-04 DIAGNOSIS — M47.812 SPONDYLOSIS OF CERVICAL REGION WITHOUT MYELOPATHY OR RADICULOPATHY: ICD-10-CM

## 2018-01-04 DIAGNOSIS — M47.816 LUMBAR SPONDYLOSIS: ICD-10-CM

## 2018-01-04 DIAGNOSIS — M47.814 THORACIC SPONDYLOSIS WITHOUT MYELOPATHY: ICD-10-CM

## 2018-01-04 LAB
CREAT BLD-MCNC: 1.1 MG/DL (ref 0.66–1.25)
GFR SERPL CREATININE-BSD FRML MDRD: 66 ML/MIN/1.7M2

## 2018-01-04 RX ORDER — IOPAMIDOL 755 MG/ML
75 INJECTION, SOLUTION INTRAVASCULAR ONCE
Status: COMPLETED | OUTPATIENT
Start: 2018-01-04 | End: 2018-01-04

## 2018-01-04 RX ADMIN — IOPAMIDOL 75 ML: 755 INJECTION, SOLUTION INTRAVASCULAR at 12:12

## 2018-01-04 NOTE — DISCHARGE INSTRUCTIONS

## 2018-01-11 ENCOUNTER — OFFICE VISIT (OUTPATIENT)
Dept: PHYSICAL MEDICINE AND REHAB | Facility: CLINIC | Age: 73
End: 2018-01-11
Attending: PHYSICIAN ASSISTANT
Payer: COMMERCIAL

## 2018-01-11 VITALS
OXYGEN SATURATION: 97 % | TEMPERATURE: 98.1 F | HEIGHT: 60 IN | SYSTOLIC BLOOD PRESSURE: 135 MMHG | RESPIRATION RATE: 20 BRPM | BODY MASS INDEX: 25.72 KG/M2 | WEIGHT: 131 LBS | DIASTOLIC BLOOD PRESSURE: 77 MMHG | HEART RATE: 79 BPM

## 2018-01-11 DIAGNOSIS — M25.552 LATERAL PAIN OF LEFT HIP: Primary | ICD-10-CM

## 2018-01-11 DIAGNOSIS — M41.20 IDIOPATHIC SCOLIOSIS IN ADULT PATIENT: ICD-10-CM

## 2018-01-11 DIAGNOSIS — M48.062 SPINAL STENOSIS OF LUMBAR REGION WITH NEUROGENIC CLAUDICATION: ICD-10-CM

## 2018-01-11 ASSESSMENT — PAIN SCALES - GENERAL: PAINLEVEL: NO PAIN (0)

## 2018-01-11 NOTE — LETTER
"1/11/2018       RE: Layla Alcaraz  [address]        [place] [address]     Dear Colleague,    Thank you for referring your patient, Layla Alcaraz, to the [facility] PHYSICAL MEDICINE AND REHABILITATION at [facility]. Please see a copy of my visit note below.    [facility] Physical Medicine & Rehabilitation Clinic Note  Clinic Visit s Jan 11, 2018    Subjective:  Layla Alcaraz is a 72 year old male who is seen in consultation at the request of Deepika Carlson PA-C for evaluation of neck/low back pain with radiation.  Patient is seen in the presence of an     Symptoms began 4-5 years ago.  Reports insidious onset without acute precipitating event.  Denies any neck/back pain presently, but does note left lateral thigh pain presently.  Pain is 0/10 currently.  States that after completion of MRI of the cervical/thoracic/lumbar spines with contrast, he noted a \"\"warm\" sensation from contrast administration with improvement of pain, approximately 95% overall currently per the patient.  Symptoms are unchanged with lumbar flexion/extension and with neck flexion/extension.  Treatment has consisted of Gabapentin.  Associated symptoms include denies any bowel/bladder dysfunction or saddle anesthesia.  Denies any numbness/tingling/weakness of the upper/lower extremities.  Denies any neck/low back surgeries.    Patient's past medical, surgical, social, and family histories are reviewed today.  There are no significant contributory medical issues  Past Medical History:   Diagnosis Date     Hearing problem        Review of Systems:  Constitutional: NEGATIVE for consistent fever, chills, or change in weight  Skin: NEGATIVE for worrisome rashes, moles, or lesions  Neuro: NEGATIVE for weakness of the upper/lower extremities  MSK: see HPI    Objective:  /77  Pulse 79  Temp 98.1  F (36.7  C)  Resp 20  Ht 1.499 m (4' 11\") "  Wt 59.4 kg (131 lb)  SpO2 97%  BMI 26.46 kg/m2  General: healthy, alert, and in no distress  Skin: no suspicious lesions or rashes  Psych: mentation appears normal and affect normal/bright  HEENT: no scleral icterus  CV: no pedal edema  Resp: normal respiratory effort without conversational dyspnea   Neuro: sensory exam is within normal limits.  Motor strength as noted below  Lymph: no palpable lymphadenopathy    MSK:    THORACIC/LUMBAR SPINE  Inspection:    No redness, swelling, overlying skin change, or gross deformity/asymmetry  Palpation:    No tenderness over the lumbar spinous processes, left para lumbar muscles, right para lumbar muscles, left SI joint, right SI joint, left sciatic notch, or right sciatic notch  Range of Motion:     Lumbar flexion within normal limits    Lumbar extension within normal limits  Strength:    5/5 - quadriceps, hamstrings, tibialis anterior, gastrocsoleus, and extensor hallicus longus  Special Tests:    Positive: None    Negative: Straight leg raise (left), slump test (left), and CAMPBELL (left)    LEFT HIP  Palpation:    No tenderness over the greater trochanter, gluteus medius, and gluteus minimus    Crepitus is absent  Passive Range of Motion:    Flexion within normal limits / IR within normal limits / ER within normal limits  Special Tests:    Positive: None    Negative: Logroll, resisted gluteus medius provocation, CAMPBELL, anterior impingement (FADIR), and passive ER with hip flexion (Drehman's)    CERVICAL SPINE  Inspection:    Forward flexed posture noted of the cervical spine   Palpation:    No tenderness over the occiput, cervical spinous processes, paracervical musculature (bilateral), upper trapezius (bilateral), or rhomboids (bilateral)  Range of Motion:     Flexion within normal limits    Extension within normal limits    Right side bend within normal limits    Left side bend within normal limits    Right rotation within normal limits    Left rotation within normal  limits  Strength:    Deltoid (C5) 5/5, biceps (C6) 5/5, wrist extension (C6) 5/5, triceps (C7) 5/5, wrist flexion (C7) 5/5, and finger flexion (C8) 5/5  Special Tests:    Positive: None    Negative: Spurling's (bilateral) and Marin's (bilateral)    Imaging:  No x-rays indicated during today's visit  Previous films/report were reviewed today, independent visualization of images was performed, and results were discussed with the patient  EMG/NCV of the Left Lower Extremity - 12/5/2017  Interpretation:  Abnormal study. There was abnormal spontaneous activity at the left lumbar paraspinals. This finding suggests left lumbosacral radiculopathy in the appropriate clinical context, but a) it is not sufficient to make this electrodiagnosis, when isolated, b) it does not allow accurate localization of the level affected, and c) it is not specific to radiculopathy, as similar findings can be seen in axial myopathy, local trauma, or even asymptomatic, healthy individuals over the age of 60. Clinical correlation is required.     MRI of the Cervical/Thoracic/Lumbar Spine Without Contrast - 9/19/2017  Impression:   1. Multilevel cervical spondylosis most pronounced at C5-6 with  moderate neural foraminal narrowing bilaterally and moderate spinal canal narrowing.  2. Multilevel thoracic spondylosis, most pronounced at T10-11 where there is moderate spinal canal narrowing and moderate neural foraminal narrowing bilaterally. Small focus of myelomalacia in the central cord.  3. Multilevel lumbar spondylosis with levoconvex scoliosis, most  pronounced at L4-5 with moderate left neural foraminal narrowing and moderate to severe spinal canal stenosis.  4. Active inflammatory arthropathy involving the facet joints of the lower lumbar spine and lower cervical spine.  5. Small round focus of enhancement in the left posterior vertebral body lower endplate of T10. Although this has slightly enlarged since 2015, this most likely represents  a vascular Schmorl's node, particularly absent a history of primary malignancy.    ASSESSMENT:  1.  Left lateral hip pain  2.  Lumbar foraminal stenosis  3.  Adult scoliosis    PLAN:  1.  Taper Gabapentin 800 mg 4 times/daily as discussed.   Take 1 less tablet after 3 days and if no return of symptoms, continue with taking 1 less tablet every 3 days until discontinuation of medication until complete discontinuation of medication.  2.  Acetaminophen/ice/heat as needed for improved pain control.  3.  Instructed to call neurosurgery office and ask if they would still like to see the patient, as currently scheduled for a follow-up clinical visit on 1/25/2018 for further evaluation of current medical state.  4.  Follow-up as needed for further evaluation/medical care.  Instructed to return to clinic if symptoms return for follow-up clinical visit.    I spent a total of 25 minutes face-to-face with the patient during today's office visit.  See office note for details.    Fredi Delatorre DO, CAQSM    Department of Rehabilitation Medicine    Disclaimer: This note consists of symbols derived from keyboarding, dictation and/or voice recognition software. As a result, there may be errors in the script that have gone undetected. Please consider this when interpreting information found in this chart.            Again, thank you for allowing me to participate in the care of your patient.      Sincerely,    Fredi Delatorre DO

## 2018-01-11 NOTE — PROGRESS NOTES
"[facility] Physical Medicine & Rehabilitation Clinic Note  Clinic Visit s Jan 11, 2018    Subjective:  Layla Alcaraz is a 72 year old male who is seen in consultation at the request of Deepika Carlson PA-C for evaluation of neck/low back pain with radiation.  Patient is seen in the presence of an     Symptoms began 4-5 years ago.  Reports insidious onset without acute precipitating event.  Denies any neck/back pain presently, but does note left lateral thigh pain presently.  Pain is 0/10 currently.  States that after completion of MRI of the cervical/thoracic/lumbar spines with contrast, he noted a \"\"warm\" sensation from contrast administration with improvement of pain, approximately 95% overall currently per the patient.  Symptoms are unchanged with lumbar flexion/extension and with neck flexion/extension.  Treatment has consisted of Gabapentin.  Associated symptoms include denies any bowel/bladder dysfunction or saddle anesthesia.  Denies any numbness/tingling/weakness of the upper/lower extremities.  Denies any neck/low back surgeries.    Patient's past medical, surgical, social, and family histories are reviewed today.  There are no significant contributory medical issues  Past Medical History:   Diagnosis Date     Hearing problem        Review of Systems:  Constitutional: NEGATIVE for consistent fever, chills, or change in weight  Skin: NEGATIVE for worrisome rashes, moles, or lesions  Neuro: NEGATIVE for weakness of the upper/lower extremities  MSK: see HPI    Objective:  /77  Pulse 79  Temp 98.1  F (36.7  C)  Resp 20  Ht 1.499 m (4' 11\")  Wt 59.4 kg (131 lb)  SpO2 97%  BMI 26.46 kg/m2  General: healthy, alert, and in no distress  Skin: no suspicious lesions or rashes  Psych: mentation appears normal and affect normal/bright  HEENT: no scleral icterus  CV: no pedal edema  Resp: normal respiratory effort without conversational dyspnea   Neuro: sensory exam is within " normal limits.  Motor strength as noted below  Lymph: no palpable lymphadenopathy    MSK:    THORACIC/LUMBAR SPINE  Inspection:    No redness, swelling, overlying skin change, or gross deformity/asymmetry  Palpation:    No tenderness over the lumbar spinous processes, left para lumbar muscles, right para lumbar muscles, left SI joint, right SI joint, left sciatic notch, or right sciatic notch  Range of Motion:     Lumbar flexion within normal limits    Lumbar extension within normal limits  Strength:    5/5 - quadriceps, hamstrings, tibialis anterior, gastrocsoleus, and extensor hallicus longus  Special Tests:    Positive: None    Negative: Straight leg raise (left), slump test (left), and CAMPBELL (left)    LEFT HIP  Palpation:    No tenderness over the greater trochanter, gluteus medius, and gluteus minimus    Crepitus is absent  Passive Range of Motion:    Flexion within normal limits / IR within normal limits / ER within normal limits  Special Tests:    Positive: None    Negative: Logroll, resisted gluteus medius provocation, CAMPBELL, anterior impingement (FADIR), and passive ER with hip flexion (Drehman's)    CERVICAL SPINE  Inspection:    Forward flexed posture noted of the cervical spine   Palpation:    No tenderness over the occiput, cervical spinous processes, paracervical musculature (bilateral), upper trapezius (bilateral), or rhomboids (bilateral)  Range of Motion:     Flexion within normal limits    Extension within normal limits    Right side bend within normal limits    Left side bend within normal limits    Right rotation within normal limits    Left rotation within normal limits  Strength:    Deltoid (C5) 5/5, biceps (C6) 5/5, wrist extension (C6) 5/5, triceps (C7) 5/5, wrist flexion (C7) 5/5, and finger flexion (C8) 5/5  Special Tests:    Positive: None    Negative: Spurling's (bilateral) and Marin's (bilateral)    Imaging:  No x-rays indicated during today's visit  Previous films/report were reviewed  today, independent visualization of images was performed, and results were discussed with the patient  EMG/NCV of the Left Lower Extremity - 12/5/2017  Interpretation:  Abnormal study. There was abnormal spontaneous activity at the left lumbar paraspinals. This finding suggests left lumbosacral radiculopathy in the appropriate clinical context, but a) it is not sufficient to make this electrodiagnosis, when isolated, b) it does not allow accurate localization of the level affected, and c) it is not specific to radiculopathy, as similar findings can be seen in axial myopathy, local trauma, or even asymptomatic, healthy individuals over the age of 60. Clinical correlation is required.     MRI of the Cervical/Thoracic/Lumbar Spine Without Contrast - 9/19/2017  Impression:   1. Multilevel cervical spondylosis most pronounced at C5-6 with  moderate neural foraminal narrowing bilaterally and moderate spinal canal narrowing.  2. Multilevel thoracic spondylosis, most pronounced at T10-11 where there is moderate spinal canal narrowing and moderate neural foraminal narrowing bilaterally. Small focus of myelomalacia in the central cord.  3. Multilevel lumbar spondylosis with levoconvex scoliosis, most  pronounced at L4-5 with moderate left neural foraminal narrowing and moderate to severe spinal canal stenosis.  4. Active inflammatory arthropathy involving the facet joints of the lower lumbar spine and lower cervical spine.  5. Small round focus of enhancement in the left posterior vertebral body lower endplate of T10. Although this has slightly enlarged since 2015, this most likely represents a vascular Schmorl's node, particularly absent a history of primary malignancy.    ASSESSMENT:  1.  Left lateral hip pain  2.  Lumbar foraminal stenosis  3.  Adult scoliosis    PLAN:  1.  Taper Gabapentin 800 mg 4 times/daily as discussed.   Take 1 less tablet after 3 days and if no return of symptoms, continue with taking 1 less tablet  every 3 days until discontinuation of medication until complete discontinuation of medication.  2.  Acetaminophen/ice/heat as needed for improved pain control.  3.  Instructed to call neurosurgery office and ask if they would still like to see the patient, as currently scheduled for a follow-up clinical visit on 1/25/2018 for further evaluation of current medical state.  4.  Follow-up as needed for further evaluation/medical care.  Instructed to return to clinic if symptoms return for follow-up clinical visit.    I spent a total of 25 minutes face-to-face with the patient during today's office visit.  See office note for details.    Fredi Delatorre DO, CAQSM    Department of Rehabilitation Medicine    Disclaimer: This note consists of symbols derived from keyboarding, dictation and/or voice recognition software. As a result, there may be errors in the script that have gone undetected. Please consider this when interpreting information found in this chart.

## 2018-01-11 NOTE — PATIENT INSTRUCTIONS
We addressed the following today:    1. Left lateral hip/thigh pain  2. Lumbar spinal stenosis  3. Lumbar arthritis    Topical Treatments: Ice or heat  Over the counter medication: Acetaminophen (Tylenol) 1000 mg every 6 hours with food (Maximum of 3000 mg/day)  Prescription Medication as directed: Gabapentin - take 1 less tablet every day for 3 days until completely off of medication  Other specific instructions: Call neurosurgery (Dr. Buenrostro's) office to determine if follow-up appointment is still needed as scheduled on 1/25/2018  Follow up as needed for further evaluation/medical care

## 2018-01-11 NOTE — NURSING NOTE
Chief Complaint   Patient presents with     Consult     [facility]- CERVICAL SPONDYLOSIS     Melquiades Cottrell, CMA

## 2018-01-25 ENCOUNTER — OFFICE VISIT (OUTPATIENT)
Dept: NEUROSURGERY | Facility: CLINIC | Age: 73
End: 2018-01-25
Payer: COMMERCIAL

## 2018-01-25 VITALS
SYSTOLIC BLOOD PRESSURE: 129 MMHG | HEART RATE: 77 BPM | HEIGHT: 60 IN | BODY MASS INDEX: 24.94 KG/M2 | DIASTOLIC BLOOD PRESSURE: 79 MMHG | WEIGHT: 127 LBS

## 2018-01-25 DIAGNOSIS — M41.55 OTHER SECONDARY SCOLIOSIS, THORACOLUMBAR REGION: ICD-10-CM

## 2018-01-25 DIAGNOSIS — M51.16 LUMBAR DISC HERNIATION WITH RADICULOPATHY: Primary | ICD-10-CM

## 2018-01-25 ASSESSMENT — ENCOUNTER SYMPTOMS
ARTHRALGIAS: 0
INSOMNIA: 0
LIGHT-HEADEDNESS: 1
FEVER: 1
PALPITATIONS: 1
SYNCOPE: 0
DECREASED CONCENTRATION: 0
WEIGHT GAIN: 0
ORTHOPNEA: 0
POLYDIPSIA: 0
NECK PAIN: 1
FATIGUE: 1
ALTERED TEMPERATURE REGULATION: 1
CHILLS: 1
POOR WOUND HEALING: 0
JOINT SWELLING: 0
PANIC: 0
HALLUCINATIONS: 0
DECREASED APPETITE: 1
WEIGHT LOSS: 0
SLEEP DISTURBANCES DUE TO BREATHING: 0
STIFFNESS: 0
MUSCLE WEAKNESS: 0
LEG PAIN: 1
HYPERTENSION: 0
EXERCISE INTOLERANCE: 0
BACK PAIN: 1
SKIN CHANGES: 0
NAIL CHANGES: 0
MUSCLE CRAMPS: 0
POLYPHAGIA: 0
INCREASED ENERGY: 1
DEPRESSION: 0
NIGHT SWEATS: 0
HYPOTENSION: 0
NERVOUS/ANXIOUS: 0
MYALGIAS: 1

## 2018-01-25 ASSESSMENT — PAIN SCALES - GENERAL: PAINLEVEL: MODERATE PAIN (5)

## 2018-01-25 NOTE — MR AVS SNAPSHOT
After Visit Summary   2018    Layla Alcaraz    MRN: [id number]           Patient Information     Date Of Birth          1945        Visit Information        Provider Department      2018 9:00 AM Genna Stein; Elizabeth Buenrostro MD [facility] Neurosurgery        Today's Diagnoses     Lumbar disc herniation with radiculopathy    -  1    Other secondary scoliosis, thoracolumbar region           Follow-ups after your visit        Who to contact     Please call your clinic at [phone] to:    Ask questions about your health    Make or cancel appointments    Discuss your medicines    Learn about your test results    Speak to your doctor   If you have compliments or concerns about an experience at your clinic, or if you wish to file a complaint, please contact [facility] Patient Relations at [phone] or email us at [email or web address]         Additional Information About Your Visit        MyChart Information     [facility] [place] is an electronic gateway that provides easy, online access to your medical records. With [facility] [place], you can request a clinic appointment, read your test results, renew a prescription or communicate with your care team.     To sign up for [facility] [place] visit the website at [email or web address]   You will be asked to enter the access code listed below, as well as some personal information. Please follow the directions to create your username and password.     Your access code is: [email or web address]  Expires: 3/28/2018  6:31 AM     Your access code will  in 90 days. If you need help or a new code, please contact your [facility] or call [phone] for assistance.        Care EveryWhere ID     This is your Care EveryWhere ID. This could be used by other organizations to access your [place] medical records  [id number]        Your Vitals Were     Pulse Height BMI (Body Mass Index)        "      77 1.499 m (4' 11\") 25.65 kg/m2          Blood Pressure from Last 3 Encounters:   01/25/18 129/79   01/11/18 135/77   12/11/17 136/74    Weight from Last 3 Encounters:   01/25/18 57.6 kg (127 lb)   01/11/18 59.4 kg (131 lb)   12/11/17 64 kg (141 lb)              We Performed the Following     [facility]-Operative Worksheet        Primary Care Provider Office Phone # Fax #    Emani Torres [phone]       [facility] CARE [address] [address]        Equal Access to Services     [facility] HospitalCHUCK : Hadii mahesh nash hadluzo Soelder, waaxda luqadaha, qaybta kaalmada adeheathyada, vinay drew . So [facility] [phone].    ATENCIÓN: Si habla español, tiene a marinelli disposición servicios gratuitos de asistencia lingüística. Llame al [phone].    We comply with applicable federal civil rights laws and [place] laws. We do not discriminate on the basis of race, color, national origin, age, disability, sex, sexual orientation, or gender identity.            Thank you!     Thank you for choosing [facility]  for your care. Our goal is always to provide you with excellent care. Hearing back from our patients is one way we can continue to improve our services. Please take a few minutes to complete the written survey that you may receive in the mail after your visit with us. Thank you!             Your Updated Medication List - Protect others around you: Learn how to safely use, store and throw away your medicines at www.disposemymeds.org.          This list is accurate as of 1/25/18 10:19 AM.  Always use your most recent med list.                   Brand Name Dispense Instructions for use Diagnosis    ASPIRIN PO      Take 81 mg by mouth daily        FLUOXETINE HCL PO      Take 40 mg by mouth daily        GABAPENTIN PO      Take 800 mg by mouth 4 times daily        PRAVASTATIN SODIUM PO      Take 80 mg by mouth daily        sennosides 8.6 MG " tablet    SENOKOT     Take 1 tablet by mouth daily
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
## 2018-01-31 DIAGNOSIS — M54.50 LOWER BACK PAIN: Primary | ICD-10-CM

## 2018-01-31 RX ORDER — CEFAZOLIN SODIUM 1 G/3ML
1 INJECTION, POWDER, FOR SOLUTION INTRAMUSCULAR; INTRAVENOUS SEE ADMIN INSTRUCTIONS
Status: CANCELLED | OUTPATIENT
Start: 2018-01-31

## 2018-01-31 RX ORDER — CEFAZOLIN SODIUM 2 G/100ML
2 INJECTION, SOLUTION INTRAVENOUS
Status: CANCELLED | OUTPATIENT
Start: 2018-01-31

## 2018-02-05 ENCOUNTER — TELEPHONE (OUTPATIENT)
Dept: NEUROSURGERY | Facility: CLINIC | Age: 73
End: 2018-02-05

## 2018-02-05 NOTE — TELEPHONE ENCOUNTER
"Writer call the phone number in the chart.  Writer wanted to clarify the surgical date.  The gentleman who answered the phone could only say \"ok.\"  In the past the writer has spoke with the pt's friend who speaks English.  It is unclear if the person on the other line understood what was being said.  Reinforce the surgery date is ok for the patient and with his friend.  "

## 2018-02-12 ENCOUNTER — ANESTHESIA EVENT (OUTPATIENT)
Dept: SURGERY | Facility: CLINIC | Age: 73
End: 2018-02-12
Payer: COMMERCIAL

## 2018-02-12 ENCOUNTER — OFFICE VISIT (OUTPATIENT)
Dept: SURGERY | Facility: CLINIC | Age: 73
End: 2018-02-12
Payer: COMMERCIAL

## 2018-02-12 ENCOUNTER — ALLIED HEALTH/NURSE VISIT (OUTPATIENT)
Dept: SURGERY | Facility: CLINIC | Age: 73
End: 2018-02-12
Payer: COMMERCIAL

## 2018-02-12 ENCOUNTER — APPOINTMENT (OUTPATIENT)
Dept: SURGERY | Facility: CLINIC | Age: 73
End: 2018-02-12
Payer: COMMERCIAL

## 2018-02-12 VITALS
DIASTOLIC BLOOD PRESSURE: 84 MMHG | RESPIRATION RATE: 18 BRPM | SYSTOLIC BLOOD PRESSURE: 131 MMHG | BODY MASS INDEX: 26.26 KG/M2 | WEIGHT: 130 LBS | HEART RATE: 108 BPM

## 2018-02-12 DIAGNOSIS — M51.16 LUMBAR DISC HERNIATION WITH RADICULOPATHY: ICD-10-CM

## 2018-02-12 DIAGNOSIS — Z01.818 PREOP EXAMINATION: Primary | ICD-10-CM

## 2018-02-12 LAB
ANION GAP SERPL CALCULATED.3IONS-SCNC: 4 MMOL/L (ref 3–14)
APTT PPP: 31 SEC (ref 22–37)
BUN SERPL-MCNC: 13 MG/DL (ref 7–30)
CALCIUM SERPL-MCNC: 8.7 MG/DL (ref 8.5–10.1)
CHLORIDE SERPL-SCNC: 103 MMOL/L (ref 94–109)
CO2 SERPL-SCNC: 31 MMOL/L (ref 20–32)
CREAT SERPL-MCNC: 1.17 MG/DL (ref 0.66–1.25)
ERYTHROCYTE [DISTWIDTH] IN BLOOD BY AUTOMATED COUNT: 14.8 % (ref 10–15)
GFR SERPL CREATININE-BSD FRML MDRD: 61 ML/MIN/1.7M2
GLUCOSE SERPL-MCNC: 99 MG/DL (ref 70–99)
HCT VFR BLD AUTO: 40 % (ref 40–53)
HGB BLD-MCNC: 12.9 G/DL (ref 13.3–17.7)
INR PPP: 0.9 (ref 0.86–1.14)
MCH RBC QN AUTO: 26.8 PG (ref 26.5–33)
MCHC RBC AUTO-ENTMCNC: 32.3 G/DL (ref 31.5–36.5)
MCV RBC AUTO: 83 FL (ref 78–100)
PLATELET # BLD AUTO: 255 10E9/L (ref 150–450)
POTASSIUM SERPL-SCNC: 4.4 MMOL/L (ref 3.4–5.3)
RBC # BLD AUTO: 4.82 10E12/L (ref 4.4–5.9)
SODIUM SERPL-SCNC: 138 MMOL/L (ref 133–144)
WBC # BLD AUTO: 5.8 10E9/L (ref 4–11)

## 2018-02-12 ASSESSMENT — LIFESTYLE VARIABLES: TOBACCO_USE: 1

## 2018-02-12 ASSESSMENT — PAIN SCALES - GENERAL: PAINLEVEL: NO PAIN (0)

## 2018-02-12 NOTE — ANESTHESIA PREPROCEDURE EVALUATION
Anesthesia Evaluation     . Pt has had prior anesthetic. Type: MAC    No history of anesthetic complications          ROS/MED HX    ENT/Pulmonary:     (+)tobacco use, Past use quit 2003, 30 pack years packs/day  , . .    Neurologic:     (+)other neuro radiculopathy left leg through foot--burning    Cardiovascular:     (+) Dyslipidemia, ----. : . . . :. . Previous cardiac testing date:results:Stress Testdate:2008 results: date: results: date: results:          METS/Exercise Tolerance:  >4 METS   Hematologic:  - neg hematologic  ROS       Musculoskeletal:   (+) , , other musculoskeletal- Chronic neck and back pain      GI/Hepatic:  - neg GI/hepatic ROS       Renal/Genitourinary:  - ROS Renal section negative       Endo:  - neg endo ROS       Psychiatric:  - neg psychiatric ROS       Infectious Disease:  - neg infectious disease ROS       Malignancy:      - no malignancy   Other:    (+) H/O Chronic Pain,                   Physical Exam  Normal systems: cardiovascular and pulmonary    Airway   Mallampati: I  TM distance: >3 FB  Neck ROM: full    Dental   (+) missing    Cardiovascular   Rhythm and rate: regular and normal      Pulmonary    breath sounds clear to auscultation               PAC Discussion and Assessment    ASA Classification: 2  Case is suitable for: [place]  Anesthetic techniques and relevant risks discussed: GA  Invasive monitoring and risk discussed:   Types:   Possibility and Risk of blood transfusion discussed:   NPO instructions given:   Additional anesthetic preparation and risks discussed:   Needs early admission to pre-op area:   Other:     PAC Resident/NP Anesthesia Assessment:  1.  Cardiac:  Functional status METS>4    Risk of Major Adverse Cardiac event: 0.4%  -HLD on statin      *Stress ECHO 2008:  Normal exericse echo without infarct or ischemia at adequate heart rate and workload.  Left ventricular systolic function is normal. The left ventricle is normal in size.  There is normal left  ventricular wall thickness. No regional wall motion abnormalities noted. The right ventricular systolic function is normal.Mild aortic sclerosis.  2.  Pulm:   BRUNILDA risk:  Low  -Former 30 pack year smoker, quit 2003  3.  GI:  Risk of PONV score =2 .  If > 2, anti-emetic intervention recommended.  4.  Meds:  -Antiplts/Anticoags: asa, patient started holding 1/29/18  Patient discussed with Dr. Salmeron.     Joanne James MS PA-C  02/12/18 2:33 PM          Mid-Level Provider/Resident:   Date:   Time:     Attending Anesthesiologist Anesthesia Assessment:        Anesthesiologist:   Date:   Time:   Pass/Fail:   Disposition:     PAC Pharmacist Assessment:        Pharmacist:   Date:   Time:      Anesthesia Plan      History & Physical Review  History and physical reviewed and following examination; no interval change.    ASA Status:  3 .    NPO Status:  > 8 hours    Plan for ETT and General with Intravenous induction. Maintenance will be Balanced.      Additional equipment: Arterial Line and 2nd IV      Postoperative Care      Consents        Procedure:  Procedure(s):  Left Minimally Invasive Lumbar 4-5 Hemilaminectomy And Microdiscectomy - Wound Class: I-Clean   - Wound Class: I-Clean    Patient Active Problem List   Diagnosis     Lumbar disc herniation with radiculopathy     Thoracic spinal cord injury, sequela (H)       Past Medical History:   Diagnosis Date     Hearing loss      Hyperlipidemia      Lumbar spondylosis        Past Surgical History:   Procedure Laterality Date     COLONOSCOPY           No current facility-administered medications on file prior to encounter.   Current Outpatient Prescriptions on File Prior to Encounter:  sennosides (SENOKOT) 8.6 MG tablet Take 1 tablet by mouth daily   PRAVASTATIN SODIUM PO Take 80 mg by mouth daily    FLUOXETINE HCL PO Take 40 mg by mouth daily   GABAPENTIN PO Take 800 mg by mouth 4 times daily    ASPIRIN PO Take 81 mg by mouth daily        No Known Allergies    Recent Labs    Lab Test  02/12/18   1542   HGB  12.9*     Recent Labs   Lab Test  02/12/18   1542   POTASSIUM  4.4     Recent Labs   Lab Test  02/12/18   1542   PLT  255     Recent Labs   Lab Test  02/12/18   1542   INR  0.90       No results found for this or any previous visit (from the past 4320 hour(s)).      Attending Anesthesiologist    Ronnie Contreras MD    *[phone]                  .

## 2018-02-12 NOTE — PATIENT INSTRUCTIONS
Preparing for Your Surgery      Name:  Layla Alcaraz   MRN:  [id number]   :  1945   Today's Date:  2018     Arriving for surgery:  Surgery date:  18  Arrival time:  6:00 am  Please come to:       [facility] [address]  [address] [place]  [address]    -   parking is available in front of the hospital from 5:15 am to 8:00 pm    -  Stop at the Information Desk in the lobby    -   Inform the information person that you are here for surgery. An escort to 3c will be provided. If you would not like an escort, please proceed to 3C on the 3rd floor. [phone]     What can I eat or drink?  -  You may have solid food or milk products until 8 hours prior to your surgery.  -  You may have water, apple juice or 7up/Sprite until 2 hours prior to your surgery.    Which medicines can I take?        Continue to hold aspirin.      No advil/ibuprofen 1 -2 days before surgery.    -  Please take these medications the day of surgery:  18  Gabapentin       Fluoxetine       pravastatin    How do I prepare myself?  -  Take two showers: one the night before surgery; and one the morning of surgery.         Use Scrubcare or Hibiclens to wash from neck down.  You may use your own shampoo and conditioner. No other hair products.   -  Do NOT use lotion, powder, deodorant, or antiperspirant the day of your surgery.  -  Do NOT wear any makeup, fingernail polish or jewelry.  -  Begin using Incentive Spirometer 1 week prior to surgery.  Use 4 times per day, up to 5 breaths each time.  Bring Incentive Spirometer to hospital.  -Do not bring your own medications to the hospital, except for inhalers and eye drops.  -  Bring your ID and insurance card.    Questions or Concerns:  If you have questions or concerns, please call the  Preoperative Assessment Center, Monday-Friday 7AM-7PM:  [phone]

## 2018-02-12 NOTE — H&P
Pre-Operative H & P     CC:  Preoperative exam to assess for increased cardiopulmonary risk while undergoing surgery and anesthesia.    Date of Encounter: February 12, 2018   Primary Care Physician:  Emani Torres   Reason for Visit/Surgery:  Lumbar disc herniation with radiculopathy [M51.16]      HPI  Layla Alcaraz is a 72 year old male who presents for pre-operative H & P in preparation for a Left Minimally Invasive Lumbar 4-5 Hemilaminectomy And Microdiscectomy on 2/27/18 with Dr. Buenrostro for Lumbar disc herniation with radiculopathy at the [facility].       Layla Alcaraz   has a past medical history of Hearing loss; Hyperlipidemia; and Lumbar spondylosis.      History is obtained from the patient and medical record including Care Everywhere.        Past Surgical History  Past Surgical History:   Procedure Laterality Date     COLONOSCOPY         Hx of Blood transfusions/reactions: No transfusion history        Personal or FH with difficulty with Anesthesia:  None with MAC, no GA history    Prior to Admission Medications  Current Outpatient Prescriptions   Medication Sig Dispense Refill     sennosides (SENOKOT) 8.6 MG tablet Take 1 tablet by mouth daily       PRAVASTATIN SODIUM PO Take 80 mg by mouth daily        FLUOXETINE HCL PO Take 40 mg by mouth daily       GABAPENTIN PO Take 800 mg by mouth 4 times daily        ASPIRIN PO Take 81 mg by mouth daily            Allergies  Review of patient's allergies indicates no known allergies.     Social History  Social History     Social History     Marital status: Single     Spouse name: N/A     Number of children: N/A     Years of education: N/A     Occupational History     Not on file.     Social History Main Topics     Smoking status: Former Smoker     Packs/day: 1.00     Years: 30.00     Types: Cigarettes     Quit date: 1/11/2003     Smokeless tobacco: Never Used     Alcohol use 0.0 oz/week     0  Standard drinks or equivalent per week      Comment: occasional     Drug use: No     Sexual activity: Not Currently     Other Topics Concern     Not on file     Social History Narrative          Family History  Family History   Problem Relation Age of Onset     Family history unknown: Yes        ROS   The complete review of systems is negative other than noted in the HPI or here.   Constitutional: Denies  fevers/chills.    EENT: Denies difficulty swallowing.  Cardiovascular: Denies chest pain, NORTON or orthopnea, palpitations or syncope.  Respiratory: Denies shortness of breath or significant cough.    GI: Denies nausea/vomiting     : Denies dysuria or urinary frequency  Musculoskeletal: Chronic back pain, no joint swelling  Skin: Denies rashes or wounds.    Hematologic: Denies anemia or blood clot history  Neurologic: Left leg burning pain; Denies history of stroke, TIA, migraines, seizures, dizziness  Psychiatric: Denies changes in mood or affect.      Cardiology Tests: (personally reviewed):   Review Results Below in A/P    Labs: (personally reviewed):  Lab Results   Component Value Date    WBC 5.8 02/12/2018    HGB 12.9 (L) 02/12/2018    IRON 92 08/25/2005    HCT 40.0 02/12/2018     02/12/2018    INR 0.90 02/12/2018    PTT 31 02/12/2018     02/12/2018    POTASSIUM 4.4 02/12/2018    REY 8.7 02/12/2018    GLC 99 02/12/2018    CR 1.17 02/12/2018    BUN 13 02/12/2018    CO2 31 02/12/2018    ALT 13 07/20/2005    AST 33 07/20/2005    BILITOTAL 0.5 07/20/2005    ALKPHOS 63 07/20/2005           Physical Exam:  No LMP for male patient.   Vital signs:  /84 (BP Location: Left arm, Patient Position: Sitting, Cuff Size: Adult Small)  Pulse 108  Resp 18  Wt 59 kg (130 lb)  BMI 26.26 kg/m2    Constitutional: Awake, alert, cooperative, no apparent distress, and appears stated age.  Eyes: Pupils equal, round and reactive to light, sclera clear, conjunctiva normal.  HENT: Normocephalic, oral pharynx with  moist mucus membranes, multiple missing teeth. No goiter appreciated.   Respiratory: Clear to auscultation bilaterally, no crackles or wheezing.  Cardiovascular: Regular rate and rhythm and no overt murmur noted.  No carotid bruits auscultated. No edema. Palpable pulses to radial  DP and PT arteries.   GI: Normal bowel sounds, soft, non-distended, non-tender, no masses palpated  Skin: Warm and dry.  No rashes at anticipated surgical site.   Musculoskeletal: Full ROM of neck. There is no redness, warmth, or swelling of the exposed joints. Gross motor strength is normal.    Neurologic: Awake, alert, oriented to name, place and time.  Gait is normal.   Neuropsychiatric: Calm, cooperative. Normal affect.     Assessment/Plan  Layla Alcaraz is a 72 year old male who presents for pre-operative H & P in preparation for a Left Minimally Invasive Lumbar 4-5 Hemilaminectomy And Microdiscectomy on 2/27/18 with Dr. Buenrostro for Lumbar disc herniation with radiculopathy at the [facility].     PAC referral for risk assessment and optimization for anesthesia with comorbid conditions of:    Pre-operative considerations:  1.  Cardiac:  Functional status METS>4    Risk of Major Adverse Cardiac event: 0.4%  -HLD on statin      *Stress ECHO 2008:  Normal exericse echo without infarct or ischemia at adequate heart rate and workload.  Left ventricular systolic function is normal. The left ventricle is normal in size.  There is normal left ventricular wall thickness. No regional wall motion abnormalities noted. The right ventricular systolic function is normal.Mild aortic sclerosis.  2.  Pulm:   BRUNILDA risk:  Low  -Former 30 pack year smoker, quit 2003  3.  GI:  Risk of PONV score =2 .  If > 2, anti-emetic intervention recommended.  4.  Meds:  -Antiplts/Anticoags: asa, patient started holding 1/29/18    Patient is optimized and is an acceptable candidate for the proposed procedure.  No  further diagnostic evaluation is needed.      AVS given to patient regarding medication instructions,  surgery time/arrival time and NPO status.  Joanne SALDAÑA-C   Preoperative Assessment Center  [facility]  Clinic and Surgery Center  Phone: [phone]  Fax: [phone]

## 2018-02-12 NOTE — MR AVS SNAPSHOT
After Visit Summary   2018    Layla Alcaraz    MRN: [id number]           Patient Information     Date Of Birth          1945        Visit Information        Provider Department      2018 2:15 PM Genna Stein; Rn, [facility] Preoperative Assessment Center        Care Instructions    Preparing for Your Surgery      Name:  Layla Alcaraz   MRN:  [id number]   :  1945   Today's Date:  2018     Arriving for surgery:  Surgery date:  18  Arrival time:  6:00 am  Please come to:       [facility] [address]  [address] [place]  [address]    -   parking is available in front of the hospital from 5:15 am to 8:00 pm    -  Stop at the Information Desk in the lobby    -   Inform the information person that you are here for surgery. An escort to 3c will be provided. If you would not like an escort, please proceed to 3C on the 3rd floor. [phone]     What can I eat or drink?  -  You may have solid food or milk products until 8 hours prior to your surgery.  -  You may have water, apple juice or 7up/Sprite until 2 hours prior to your surgery.    Which medicines can I take?        Continue to hold aspirin.      No advil/ibuprofen 1 -2 days before surgery.    -  Please take these medications the day of surgery:  18  Gabapentin       Fluoxetine       pravastatin    How do I prepare myself?  -  Take two showers: one the night before surgery; and one the morning of surgery.         Use Scrubcare or Hibiclens to wash from neck down.  You may use your own shampoo and conditioner. No other hair products.   -  Do NOT use lotion, powder, deodorant, or antiperspirant the day of your surgery.  -  Do NOT wear any makeup, fingernail polish or jewelry.  -  Begin using Incentive Spirometer 1 week prior to surgery.  Use 4 times per day, up to 5 breaths each time.  Bring Incentive Spirometer to hospital.  -Do not bring your  own medications to the hospital, except for inhalers and eye drops.  -  Bring your ID and insurance card.    Questions or Concerns:  If you have questions or concerns, please call the  Preoperative Assessment Center, Monday-Friday 7AM-7PM:  [phone]                    Follow-ups after your visit        Your next 10 appointments already scheduled     2018   Procedure with Elizabeth Buenrostro MD   [facility], [place], Same Day Surgery (--)    [address]  [place] [address] [phone]              Future tests that were ordered for you today     Open Future Orders        Priority Expected Expires Ordered    ABO/Rh type and screen Routine 2018 3/14/2018 2018    CBC with platelets Routine 2018 3/14/2018 2018    Basic metabolic panel Routine 2018 3/14/2018 2018    INR Routine 2018 3/14/2018 2018    Partial thromboplastin time Routine 2018 3/14/2018 2018            Who to contact     Please call your clinic at [phone] to:    Ask questions about your health    Make or cancel appointments    Discuss your medicines    Learn about your test results    Speak to your doctor            Additional Information About Your Visit        [employer] - Biomolecular Determination Information     [employer] is an electronic gateway that provides easy, online access to your medical records. With [employer], you can request a clinic appointment, read your test results, renew a prescription or communicate with your care team.     To sign up for [employer] visit the website at [email or web address]   You will be asked to enter the access code listed below, as well as some personal information. Please follow the directions to create your username and password.     Your access code is: [email or web address]  Expires: 3/28/2018  6:31 AM     Your access code will  in 90 days. If you need help or a new code, please contact your [facility] or call [phone] for assistance.         Care EveryWhere ID     This is your Care EveryWhere ID. This could be used by other organizations to access your [facility] medical records  [id number]         Blood Pressure from Last 3 Encounters:   02/12/18 131/84   01/25/18 129/79   01/11/18 135/77    Weight from Last 3 Encounters:   02/12/18 59 kg (130 lb)   01/25/18 57.6 kg (127 lb)   01/11/18 59.4 kg (131 lb)              Today, you had the following     No orders found for display       Primary Care Provider Office Phone # Fax #    Emani Torres [phone]       [facility] CARE [address] [facility] [address]        Equal Access to Services     CISCO LEONG : Hadii mahesh nash hadasho Sodomiali, waaxda luqadaha, qaybta kaalmada adeegyada, waxsanjay drew . So [facility] [phone].    ATENCIÓN: Si habla español, tiene a marinelli disposición servicios gratuitos de asistencia lingüística. [facility] [phone].    We comply with applicable federal civil rights laws and [place] laws. We do not discriminate on the basis of race, color, national origin, age, disability, sex, sexual orientation, or gender identity.            Thank you!     Thank you for choosing [facility] PREOPERATIVE ASSESSMENT CENTER  for your care. Our goal is always to provide you with excellent care. Hearing back from our patients is one way we can continue to improve our services. Please take a few minutes to complete the written survey that you may receive in the mail after your visit with us. Thank you!             Your Updated Medication List - Protect others around you: Learn how to safely use, store and throw away your medicines at www.disposemymeds.org.          This list is accurate as of 2/12/18  2:48 PM.  Always use your most recent med list.                   Brand Name Dispense Instructions for use Diagnosis    ASPIRIN PO      Take 81 mg by mouth daily        FLUOXETINE HCL PO      Take 40 mg by mouth daily         GABAPENTIN PO      Take 800 mg by mouth 4 times daily        PRAVASTATIN SODIUM PO      Take 80 mg by mouth daily        sennosides 8.6 MG tablet    SENOKOT     Take 1 tablet by mouth daily

## 2018-02-26 ENCOUNTER — TELEPHONE (OUTPATIENT)
Dept: NEUROSURGERY | Facility: CLINIC | Age: 73
End: 2018-02-26

## 2018-02-26 NOTE — TELEPHONE ENCOUNTER
Writer attempted to call pt to be sure that there were not any last minute questions before surgery.  The first number [phone] did not work.  The second number [phone] no at that number spoke English.

## 2018-02-27 ENCOUNTER — ANESTHESIA (OUTPATIENT)
Dept: SURGERY | Facility: CLINIC | Age: 73
End: 2018-02-27
Payer: COMMERCIAL

## 2018-02-27 ENCOUNTER — SURGERY (OUTPATIENT)
Age: 73
End: 2018-02-27

## 2018-02-27 ENCOUNTER — APPOINTMENT (OUTPATIENT)
Dept: GENERAL RADIOLOGY | Facility: CLINIC | Age: 73
End: 2018-02-27
Attending: NEUROLOGICAL SURGERY
Payer: COMMERCIAL

## 2018-02-27 ENCOUNTER — HOSPITAL ENCOUNTER (OUTPATIENT)
Facility: CLINIC | Age: 73
Discharge: HOME | End: 2018-02-27
Attending: NEUROLOGICAL SURGERY | Admitting: NEUROLOGICAL SURGERY
Payer: COMMERCIAL

## 2018-02-27 ENCOUNTER — APPOINTMENT (OUTPATIENT)
Dept: PHYSICAL THERAPY | Facility: CLINIC | Age: 73
End: 2018-02-27
Attending: NEUROLOGICAL SURGERY
Payer: COMMERCIAL

## 2018-02-27 VITALS
WEIGHT: 127.21 LBS | BODY MASS INDEX: 24.97 KG/M2 | TEMPERATURE: 98.3 F | RESPIRATION RATE: 16 BRPM | OXYGEN SATURATION: 96 % | SYSTOLIC BLOOD PRESSURE: 130 MMHG | HEIGHT: 60 IN | DIASTOLIC BLOOD PRESSURE: 95 MMHG

## 2018-02-27 DIAGNOSIS — M54.16 LUMBAR RADICULOPATHY: ICD-10-CM

## 2018-02-27 DIAGNOSIS — S24.109S THORACIC SPINAL CORD INJURY, SEQUELA (H): ICD-10-CM

## 2018-02-27 DIAGNOSIS — M51.16 LUMBAR DISC HERNIATION WITH RADICULOPATHY: ICD-10-CM

## 2018-02-27 DIAGNOSIS — M54.50 LOWER BACK PAIN: Primary | ICD-10-CM

## 2018-02-27 DIAGNOSIS — Z98.890 S/P LUMBAR MICRODISCECTOMY: ICD-10-CM

## 2018-02-27 DIAGNOSIS — Z01.818 PREOP EXAMINATION: ICD-10-CM

## 2018-02-27 PROBLEM — M54.9 BACK PAIN: Status: ACTIVE | Noted: 2018-02-27

## 2018-02-27 LAB
ABO + RH BLD: NORMAL
ABO + RH BLD: NORMAL
BLD GP AB SCN SERPL QL: NORMAL
BLOOD BANK CMNT PATIENT-IMP: NORMAL
BLOOD BANK CMNT PATIENT-IMP: NORMAL
GLUCOSE BLDC GLUCOMTR-MCNC: 88 MG/DL (ref 70–99)
INTERPRETATION ECG - MUSE: NORMAL
SPECIMEN EXP DATE BLD: NORMAL

## 2018-02-27 PROCEDURE — G8978 MOBILITY CURRENT STATUS: HCPCS | Mod: GP,CI

## 2018-02-27 PROCEDURE — 37000009 ZZH ANESTHESIA TECHNICAL FEE, EACH ADDTL 15 MIN: Performed by: NEUROLOGICAL SURGERY

## 2018-02-27 PROCEDURE — C9399 UNCLASSIFIED DRUGS OR BIOLOG: HCPCS | Performed by: NURSE ANESTHETIST, CERTIFIED REGISTERED

## 2018-02-27 PROCEDURE — 25000128 H RX IP 250 OP 636: Performed by: NEUROLOGICAL SURGERY

## 2018-02-27 PROCEDURE — 36000064 ZZH SURGERY LEVEL 4 EA 15 ADDTL MIN - UMMC: Performed by: NEUROLOGICAL SURGERY

## 2018-02-27 PROCEDURE — 40000277 XR SURGERY CARM FLUORO LESS THAN 5 MIN W STILLS: Mod: TC

## 2018-02-27 PROCEDURE — 27210995 ZZH RX 272: Performed by: NEUROLOGICAL SURGERY

## 2018-02-27 PROCEDURE — 37000008 ZZH ANESTHESIA TECHNICAL FEE, 1ST 30 MIN: Performed by: NEUROLOGICAL SURGERY

## 2018-02-27 PROCEDURE — 36000066 ZZH SURGERY LEVEL 4 W FLUORO 1ST 30 MIN - UMMC: Performed by: NEUROLOGICAL SURGERY

## 2018-02-27 PROCEDURE — 97161 PT EVAL LOW COMPLEX 20 MIN: CPT | Mod: GP

## 2018-02-27 PROCEDURE — 40000065 ZZH STATISTIC EKG NON-CHARGEABLE

## 2018-02-27 PROCEDURE — 97530 THERAPEUTIC ACTIVITIES: CPT | Mod: GP

## 2018-02-27 PROCEDURE — G8979 MOBILITY GOAL STATUS: HCPCS | Mod: GP,CH

## 2018-02-27 PROCEDURE — 82962 GLUCOSE BLOOD TEST: CPT

## 2018-02-27 PROCEDURE — 25000128 H RX IP 250 OP 636: Performed by: STUDENT IN AN ORGANIZED HEALTH CARE EDUCATION/TRAINING PROGRAM

## 2018-02-27 PROCEDURE — 40000170 ZZH STATISTIC PRE-PROCEDURE ASSESSMENT II: Performed by: NEUROLOGICAL SURGERY

## 2018-02-27 PROCEDURE — 40000014 ZZH STATISTIC ARTERIAL MONITORING DAILY

## 2018-02-27 PROCEDURE — 25000125 ZZHC RX 250: Performed by: NURSE ANESTHETIST, CERTIFIED REGISTERED

## 2018-02-27 PROCEDURE — 27210794 ZZH OR GENERAL SUPPLY STERILE: Performed by: NEUROLOGICAL SURGERY

## 2018-02-27 PROCEDURE — 25000128 H RX IP 250 OP 636: Performed by: NURSE ANESTHETIST, CERTIFIED REGISTERED

## 2018-02-27 PROCEDURE — 40000275 ZZH STATISTIC RCP TIME EA 10 MIN

## 2018-02-27 PROCEDURE — 71000014 ZZH RECOVERY PHASE 1 LEVEL 2 FIRST HR: Performed by: NEUROLOGICAL SURGERY

## 2018-02-27 PROCEDURE — 40000193 ZZH STATISTIC PT WARD VISIT

## 2018-02-27 PROCEDURE — G8980 MOBILITY D/C STATUS: HCPCS | Mod: GP,CH

## 2018-02-27 PROCEDURE — 93010 ELECTROCARDIOGRAM REPORT: CPT | Performed by: INTERNAL MEDICINE

## 2018-02-27 PROCEDURE — 25000565 ZZH ISOFLURANE, EA 15 MIN: Performed by: NEUROLOGICAL SURGERY

## 2018-02-27 PROCEDURE — 25000125 ZZHC RX 250: Performed by: NEUROLOGICAL SURGERY

## 2018-02-27 RX ORDER — DEXAMETHASONE SODIUM PHOSPHATE 10 MG/ML
INJECTION, SOLUTION INTRAMUSCULAR; INTRAVENOUS PRN
Status: DISCONTINUED | OUTPATIENT
Start: 2018-02-27 | End: 2018-02-27

## 2018-02-27 RX ORDER — SODIUM CHLORIDE, SODIUM LACTATE, POTASSIUM CHLORIDE, CALCIUM CHLORIDE 600; 310; 30; 20 MG/100ML; MG/100ML; MG/100ML; MG/100ML
INJECTION, SOLUTION INTRAVENOUS CONTINUOUS
Status: DISCONTINUED | OUTPATIENT
Start: 2018-02-27 | End: 2018-02-27

## 2018-02-27 RX ORDER — ACETAMINOPHEN 325 MG/1
650 TABLET ORAL
Status: DISCONTINUED | OUTPATIENT
Start: 2018-02-27 | End: 2018-02-27 | Stop reason: HOSPADM

## 2018-02-27 RX ORDER — PROPOFOL 10 MG/ML
INJECTION, EMULSION INTRAVENOUS PRN
Status: DISCONTINUED | OUTPATIENT
Start: 2018-02-27 | End: 2018-02-27

## 2018-02-27 RX ORDER — SENNOSIDES 8.6 MG
1 TABLET ORAL DAILY
Status: DISCONTINUED | OUTPATIENT
Start: 2018-02-27 | End: 2018-02-27 | Stop reason: HOSPADM

## 2018-02-27 RX ORDER — CEFAZOLIN SODIUM 1 G/3ML
1 INJECTION, POWDER, FOR SOLUTION INTRAMUSCULAR; INTRAVENOUS SEE ADMIN INSTRUCTIONS
Status: DISCONTINUED | OUTPATIENT
Start: 2018-02-27 | End: 2018-02-27 | Stop reason: HOSPADM

## 2018-02-27 RX ORDER — SODIUM CHLORIDE, SODIUM LACTATE, POTASSIUM CHLORIDE, CALCIUM CHLORIDE 600; 310; 30; 20 MG/100ML; MG/100ML; MG/100ML; MG/100ML
INJECTION, SOLUTION INTRAVENOUS CONTINUOUS PRN
Status: DISCONTINUED | OUTPATIENT
Start: 2018-02-27 | End: 2018-02-27

## 2018-02-27 RX ORDER — POLYETHYLENE GLYCOL 3350 17 G/17G
1 POWDER, FOR SOLUTION ORAL DAILY
Qty: 510 G | Refills: 1 | Status: SHIPPED | OUTPATIENT
Start: 2018-02-27

## 2018-02-27 RX ORDER — OXYCODONE HYDROCHLORIDE 5 MG/1
5 TABLET ORAL EVERY 6 HOURS PRN
Status: DISCONTINUED | OUTPATIENT
Start: 2018-02-27 | End: 2018-02-27 | Stop reason: HOSPADM

## 2018-02-27 RX ORDER — ONDANSETRON 4 MG/1
4 TABLET, ORALLY DISINTEGRATING ORAL EVERY 30 MIN PRN
Status: DISCONTINUED | OUTPATIENT
Start: 2018-02-27 | End: 2018-02-27

## 2018-02-27 RX ORDER — ONDANSETRON 2 MG/ML
4 INJECTION INTRAMUSCULAR; INTRAVENOUS EVERY 30 MIN PRN
Status: DISCONTINUED | OUTPATIENT
Start: 2018-02-27 | End: 2018-02-27

## 2018-02-27 RX ORDER — ASPIRIN 81 MG/1
81 TABLET, CHEWABLE ORAL DAILY
Qty: 36 TABLET | COMMUNITY
Start: 2018-03-06 | End: 2018-02-27

## 2018-02-27 RX ORDER — NALOXONE HYDROCHLORIDE 0.4 MG/ML
.1-.4 INJECTION, SOLUTION INTRAMUSCULAR; INTRAVENOUS; SUBCUTANEOUS
Status: DISCONTINUED | OUTPATIENT
Start: 2018-02-27 | End: 2018-02-27 | Stop reason: HOSPADM

## 2018-02-27 RX ORDER — FENTANYL CITRATE 50 UG/ML
25-50 INJECTION, SOLUTION INTRAMUSCULAR; INTRAVENOUS
Status: DISCONTINUED | OUTPATIENT
Start: 2018-02-27 | End: 2018-02-27

## 2018-02-27 RX ORDER — OXYCODONE HYDROCHLORIDE 5 MG/1
5 TABLET ORAL EVERY 6 HOURS PRN
Qty: 45 TABLET | Refills: 0 | Status: SHIPPED | OUTPATIENT
Start: 2018-02-27 | End: 2019-12-12

## 2018-02-27 RX ORDER — LIDOCAINE HYDROCHLORIDE 20 MG/ML
INJECTION, SOLUTION INFILTRATION; PERINEURAL PRN
Status: DISCONTINUED | OUTPATIENT
Start: 2018-02-27 | End: 2018-02-27

## 2018-02-27 RX ORDER — LIDOCAINE 40 MG/G
CREAM TOPICAL
Status: DISCONTINUED | OUTPATIENT
Start: 2018-02-27 | End: 2018-02-27 | Stop reason: HOSPADM

## 2018-02-27 RX ORDER — EPHEDRINE SULFATE 50 MG/ML
INJECTION, SOLUTION INTRAMUSCULAR; INTRAVENOUS; SUBCUTANEOUS PRN
Status: DISCONTINUED | OUTPATIENT
Start: 2018-02-27 | End: 2018-02-27

## 2018-02-27 RX ORDER — GABAPENTIN 400 MG/1
800 CAPSULE ORAL 4 TIMES DAILY
Status: DISCONTINUED | OUTPATIENT
Start: 2018-02-27 | End: 2018-02-27 | Stop reason: HOSPADM

## 2018-02-27 RX ORDER — ASPIRIN 81 MG/1
81 TABLET, CHEWABLE ORAL DAILY
Qty: 60 TABLET | Refills: 11 | Status: SHIPPED | OUTPATIENT
Start: 2018-03-06

## 2018-02-27 RX ORDER — FENTANYL CITRATE 50 UG/ML
INJECTION, SOLUTION INTRAMUSCULAR; INTRAVENOUS PRN
Status: DISCONTINUED | OUTPATIENT
Start: 2018-02-27 | End: 2018-02-27

## 2018-02-27 RX ORDER — PRAVASTATIN SODIUM 40 MG
80 TABLET ORAL DAILY
Status: DISCONTINUED | OUTPATIENT
Start: 2018-02-27 | End: 2018-02-27 | Stop reason: HOSPADM

## 2018-02-27 RX ORDER — BUPIVACAINE HYDROCHLORIDE AND EPINEPHRINE 5; 5 MG/ML; UG/ML
INJECTION, SOLUTION PERINEURAL PRN
Status: DISCONTINUED | OUTPATIENT
Start: 2018-02-27 | End: 2018-02-27

## 2018-02-27 RX ORDER — CEFAZOLIN SODIUM 2 G/100ML
2 INJECTION, SOLUTION INTRAVENOUS
Status: COMPLETED | OUTPATIENT
Start: 2018-02-27 | End: 2018-02-27

## 2018-02-27 RX ORDER — ONDANSETRON 2 MG/ML
INJECTION INTRAMUSCULAR; INTRAVENOUS PRN
Status: DISCONTINUED | OUTPATIENT
Start: 2018-02-27 | End: 2018-02-27

## 2018-02-27 RX ADMIN — ROCURONIUM BROMIDE 60 MG: 10 INJECTION INTRAVENOUS at 08:07

## 2018-02-27 RX ADMIN — SODIUM CHLORIDE, POTASSIUM CHLORIDE, SODIUM LACTATE AND CALCIUM CHLORIDE: 600; 310; 30; 20 INJECTION, SOLUTION INTRAVENOUS at 08:19

## 2018-02-27 RX ADMIN — PHENYLEPHRINE HYDROCHLORIDE 100 MCG: 10 INJECTION, SOLUTION INTRAMUSCULAR; INTRAVENOUS; SUBCUTANEOUS at 08:20

## 2018-02-27 RX ADMIN — LIDOCAINE HYDROCHLORIDE 100 MG: 20 INJECTION, SOLUTION INFILTRATION; PERINEURAL at 08:07

## 2018-02-27 RX ADMIN — SUGAMMADEX 120 MG: 100 INJECTION, SOLUTION INTRAVENOUS at 10:46

## 2018-02-27 RX ADMIN — MIDAZOLAM 1 MG: 1 INJECTION INTRAMUSCULAR; INTRAVENOUS at 07:52

## 2018-02-27 RX ADMIN — FENTANYL CITRATE 50 MCG: 50 INJECTION, SOLUTION INTRAMUSCULAR; INTRAVENOUS at 10:40

## 2018-02-27 RX ADMIN — PHENYLEPHRINE HYDROCHLORIDE 100 MCG: 10 INJECTION, SOLUTION INTRAMUSCULAR; INTRAVENOUS; SUBCUTANEOUS at 08:12

## 2018-02-27 RX ADMIN — CEFAZOLIN 1 G: 1 INJECTION, POWDER, FOR SOLUTION INTRAMUSCULAR; INTRAVENOUS at 10:32

## 2018-02-27 RX ADMIN — BUPIVACAINE HYDROCHLORIDE AND EPINEPHRINE BITARTRATE 8 ML: 5; .005 INJECTION, SOLUTION EPIDURAL; INTRACAUDAL; PERINEURAL at 09:06

## 2018-02-27 RX ADMIN — BUPIVACAINE HYDROCHLORIDE AND EPINEPHRINE BITARTRATE 4 ML: 5; .005 INJECTION, SOLUTION EPIDURAL; INTRACAUDAL; PERINEURAL at 10:48

## 2018-02-27 RX ADMIN — Medication 5 MG: at 08:21

## 2018-02-27 RX ADMIN — FENTANYL CITRATE 100 MCG: 50 INJECTION, SOLUTION INTRAMUSCULAR; INTRAVENOUS at 08:07

## 2018-02-27 RX ADMIN — PHENYLEPHRINE HYDROCHLORIDE 100 MCG: 10 INJECTION, SOLUTION INTRAMUSCULAR; INTRAVENOUS; SUBCUTANEOUS at 08:14

## 2018-02-27 RX ADMIN — PROPOFOL 10 MG: 10 INJECTION, EMULSION INTRAVENOUS at 09:25

## 2018-02-27 RX ADMIN — Medication 1 KIT: at 09:04

## 2018-02-27 RX ADMIN — GELATIN ABSORBABLE SPONGE 12-7 MM 1 EACH: 12-7 MISC at 09:06

## 2018-02-27 RX ADMIN — PHENYLEPHRINE HYDROCHLORIDE 100 MCG: 10 INJECTION, SOLUTION INTRAMUSCULAR; INTRAVENOUS; SUBCUTANEOUS at 08:07

## 2018-02-27 RX ADMIN — SODIUM CHLORIDE 1000 ML: 900 IRRIGANT IRRIGATION at 09:03

## 2018-02-27 RX ADMIN — FENTANYL CITRATE 50 MCG: 50 INJECTION, SOLUTION INTRAMUSCULAR; INTRAVENOUS at 09:54

## 2018-02-27 RX ADMIN — CEFAZOLIN SODIUM 2 G: 2 INJECTION, SOLUTION INTRAVENOUS at 08:35

## 2018-02-27 RX ADMIN — PHENYLEPHRINE HYDROCHLORIDE 150 MCG: 10 INJECTION, SOLUTION INTRAMUSCULAR; INTRAVENOUS; SUBCUTANEOUS at 08:19

## 2018-02-27 RX ADMIN — THROMBIN, TOPICAL (BOVINE) 5000 UNITS: KIT at 09:06

## 2018-02-27 RX ADMIN — SODIUM CHLORIDE, POTASSIUM CHLORIDE, SODIUM LACTATE AND CALCIUM CHLORIDE: 600; 310; 30; 20 INJECTION, SOLUTION INTRAVENOUS at 07:52

## 2018-02-27 RX ADMIN — DEXAMETHASONE SODIUM PHOSPHATE 10 MG: 10 INJECTION, SOLUTION INTRAMUSCULAR; INTRAVENOUS at 08:00

## 2018-02-27 RX ADMIN — ONDANSETRON 4 MG: 2 INJECTION INTRAMUSCULAR; INTRAVENOUS at 10:36

## 2018-02-27 RX ADMIN — PHENYLEPHRINE HYDROCHLORIDE 0.5 MCG/KG/MIN: 10 INJECTION, SOLUTION INTRAMUSCULAR; INTRAVENOUS; SUBCUTANEOUS at 08:35

## 2018-02-27 RX ADMIN — PROPOFOL 70 MG: 10 INJECTION, EMULSION INTRAVENOUS at 08:07

## 2018-02-27 ASSESSMENT — VISUAL ACUITY: OU: NORMAL ACUITY

## 2018-02-27 NOTE — PLAN OF CARE
Problem: Patient Care Overview  Goal: Plan of Care/Patient Progress Review  Discharge Planner PT   Patient plan for discharge: Home with caregiver assist (family)  Current status: Performs bed mobility and amb with SBA >indep once cued for safety/technique. No loss of balance with balance challenges.  Barriers to return to prior living situation: None  Recommendations for discharge: Home with caregiver assist (once available) as pt lives alone and normally has caregiver support. Family likely to provide this today vs tomorrow.  Rationale for recommendations: Current level of function.       Entered by: Charan Crews 02/27/2018 5:05 PM
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
## 2018-02-27 NOTE — IP AVS SNAPSHOT
[address] Observation [address] [address]    Phone:  [phone]    Fax:  [phone]                                       After Visit Summary   2/27/2018    Layla Alcaraz    MRN: [id number]           After Visit Summary Signature Page     I have received my discharge instructions, and my questions have been answered. I have discussed any challenges I see with this plan with the nurse or doctor.    ..........................................................................................................................................  Patient/Patient Representative Signature      ..........................................................................................................................................  Patient Representative Print Name and Relationship to Patient    ..................................................               ................................................  Date                                            Time    ..........................................................................................................................................  Reviewed by Signature/Title    ...................................................              ..............................................  Date                                                            Time

## 2018-02-27 NOTE — BRIEF OP NOTE
[facility], [place]    Brief Operative Note    Pre-operative diagnosis: Lumbar Disc Herniation With Radiculopathy      Post-operative diagnosis Same  Procedure: Procedure(s):  Left Minimally Invasive Lumbar 4-5 Hemilaminectomy And Microdiscectomy - Wound Class: I-Clean   - Wound Class: I-Clean  Surgeon: Surgeon(s) and Role:     * Elizabeth Buenrostro MD - Primary     * Abdifatah Yates MD - Resident - Assisting  Anesthesia: General   Estimated blood loss: 5 mL  Drains: None  Specimens: * No specimens in log *  Findings:   Good decompression of left L5 .  Complications: None.  Implants: None.

## 2018-02-27 NOTE — PROGRESS NOTES
Brief pre-operative assessment:    Left L5 radicular pain without sensory changes  Strength 5/5 in BUE  Strength 5/5 throughout HF/HE, KF/KE, PF/DF, EHL  Reflexes 2/4 at patella, no ankle clonus      Abdifatah Yates MD, PhD  Neurosurgery PGY-2

## 2018-02-27 NOTE — ANESTHESIA CARE TRANSFER NOTE
Patient: Layla Alcaraz    Procedure(s):  Left Minimally Invasive Lumbar 4-5 Hemilaminectomy And Microdiscectomy - Wound Class: I-Clean   - Wound Class: I-Clean    Diagnosis: Lumbar Disc Herniation With Radiculopathy      Diagnosis Additional Information: No value filed.    Anesthesia Type:   No value filed.     Note:  Airway :Face Mask  Patient transferred to:PACU  Comments: VSS. Report to RN. 110/66. MAP 80. 100%. Patients opens eyes to command.Handoff Report: Identifed the Patient, Identified the Reponsible Provider, Reviewed the pertinent medical history, Discussed the surgical course, Reviewed Intra-OP anesthesia mangement and issues during anesthesia, Set expectations for post-procedure period and Allowed opportunity for questions and acknowledgement of understanding      Vitals: (Last set prior to Anesthesia Care Transfer)    CRNA VITALS  2/27/2018 1028 - 2/27/2018 1104      2/27/2018             Pulse: 100    ART BP: 135/73    ART Mean: 102    SpO2: 96 %    Resp Rate (observed): (!)  1                Electronically Signed By: OMAR Steeel CRNA  February 27, 2018  11:04 AM

## 2018-02-27 NOTE — DISCHARGE SUMMARY
[facility] Discharge Summary and Instructions    Layla Alcaraz MRN# [id number]   Age: 72 year old YOB: 1945     Date of Admission:  2/27/2018  Date of Discharge::  2/27/2018  Admitting Physician:  Elizabeth Buenrostro MD  Discharge Physician:  Elizabeth Buenrostro MD          Admission Diagnoses:   Lumbar Disc Herniation With Radiculopathy  Back pain          Discharge Diagnosis:   Lumbar Disc Herniation With Radiculopathy  Back pain          Procedures:   Left Minimally Invasive Lumbar 4-5 Hemilaminectomy And Microdiscectomy           Brief History of Illness:   Mr. Alcaraz is a 72 year old man who has been complaining of back and left leg pain for many years. Pain is worse with walking and movements, and rather severe at times. He has been on gabapentin for greater than 2 years and doesn't notice that it makes much difference. No weakness reported. Patient was evaluated by Dr. Buenrostro and was found to have a disk herniation at L4-5 with left lateral recess stenosis. He is indicated for the above procedure.           Hospital Course:   Patient underwent above-mentioned procedure on 2/27. The operation was uncomplicated and he was admitted to observation for monitoring. He was ambulating, voiding without a barger, eating a regular diet, pain was well controlled. He was also evaluated by PT who recommended discharge home. He was discharged in stable condition.          Discharge Medications:     Current Discharge Medication List      START taking these medications    Details   polyethylene glycol (MIRALAX) powder Take 17 g (1 capful) by mouth daily  Qty: 510 g, Refills: 1    Associated Diagnoses: S/P lumbar microdiscectomy      oxyCODONE IR (ROXICODONE) 5 MG tablet Take 1 tablet (5 mg) by mouth every 6 hours as needed for moderate to severe pain (pain control or improvement in physical function. )  Qty: 45 tablet, Refills: 0    Associated Diagnoses: S/P lumbar microdiscectomy          CONTINUE these medications which have CHANGED    Details   aspirin 81 MG chewable tablet Take 1 tablet (81 mg) by mouth daily  Qty: 60 tablet, Refills: 11    Associated Diagnoses: Lumbar radiculopathy         CONTINUE these medications which have NOT CHANGED    Details   sennosides (SENOKOT) 8.6 MG tablet Take 1 tablet by mouth daily      PRAVASTATIN SODIUM PO Take 80 mg by mouth daily       FLUOXETINE HCL PO Take 40 mg by mouth daily      GABAPENTIN PO Take 800 mg by mouth 4 times daily                      Discharge Instructions and Follow-Up:   Discharge diet: Regular   Discharge activity: You may advance activity as tolerated. No strenuous exercise or heay lifting greater than 10 lbs for 4 weeks or until seen and cleared in clinic.   Discharge follow-up: Follow-up with Deepika Carlson PA-C in 2 weeks for wound check, and in 6 weeks for postsurgical visit.   Wound care: Ok to shower,however no scrubbing of the wound and no soaking of the wound, meaning no bathtubs or swimming pools. Pat dry only. Leave wound open to air.  Sutures are not absorbable and need to be removed in 2 weeks. If patient still at rehab by this time, the sutures may be removed by the rehab physician if he or she considers that the wound has healed completely.     Please call if you have:  1. increased pain, redness, drainage, swelling at your incision  2. fevers > 101.5 F degrees  3. with any questions or concerns.  You may reach the Neurosurgery clinic at [phone] during regular work hours. ER at [phone].    and ask for the Neurosurgery Resident on call at [phone], during off hours or weekends.         Discharge Disposition:   Discharged to home          -----------------------------------  Vandana Leger MD, MS  Neurosurgery PGY-1  Pager #[phone]

## 2018-02-27 NOTE — IP AVS SNAPSHOT
MRN:[id number]                      After Visit Summary   2/27/2018    Layla Alcaraz    MRN: [id number]           Thank you!     Thank you for choosing [facility] for your care. Our goal is always to provide you with excellent care. Hearing back from our patients is one way we can continue to improve our services. Please take a few minutes to complete the written survey that you may receive in the mail after you visit with us. Thank you!        Patient Information     Date Of Birth          1945        About your hospital stay     You were admitted on:  February 27, 2018 You last received care in the:  [facility]    You were discharged on:  February 27, 2018        Reason for your hospital stay       Left L4-5 Hemilaminectomy and discectomy                  Who to Call     For medical emergencies, please call 911.  For non-urgent questions about your medical care, please call your primary care provider or clinic, [phone]  For questions related to your surgery, please call your surgery clinic        Attending Provider     Provider Specialty    Elizabeth Buenrostro MD Neurosurgery       Primary Care Provider Office Phone # Fax #    Emani Torres [phone]      After Care Instructions     Activity       Your activity upon discharge:   Do not do any bending, twisting, strenuous exercise, or heavy lifting (greater than 10 pounds) for 4-6 weeks. Be careful and ask for assistance when walking or going up and down stairs. Avoid any activities that could result in trauma to the surgical wound. Do not drive within 3 months of having your last seizure or while using narcotics or other sedating medications, such as sleep aids, muscle relaxants, etc.            Diet       Follow this diet upon discharge: None            Discharge Instructions       You underwent surgery on your  lumbar spine for decompression by Dr. Elizabeth Buenrostro.     - You will  have follow up with your Primary Care Provider in 2 weeks for a wound check.  If your sutures are not absorbable they will removed at this date.     - You will see your surgeon or their nurse practitioner at 6 weeks and your surgeon at 3 months.     - You may take medications like Advil, Motrin, Ibuprofen, Celebrex, Aleve or aspirin to help with your pain control if needed.     - If you were given a brace, please wear it as directed until you are seen in follow up in the Neurosurgery Clinic.  Often times these need to be worn for up to 3 months.     -If you have not heard from our clinic about your follow up visit by 3-4 days following your discharge, please call our clinic at [phone] to schedule an appointment with Dr. Buenrostro      After discharge, your activity restrictions are:   - We encourage short frequent walks, increasing as tolerated.  - Avoid housework, vacuuming, laundry, leaf raking, lawn mowing and snow removal.   - No driving until you are seen in clinic and cleared by your neurosurgeon. You should not drive while on narcotic pain medication.   -If you have a brace, please wear it as directed until you are seen in follow up.  Often times these need to be worn for up to 3 months.    - No strenuous activity.  - No lifting more than 10 pounds until you are seen in clinic (a gallon of milk weighs approximately 8 pounds)  - You are ok to shower, but do not soak your incisions. Pat them dry if they get damp.   - Avoid coloring your hair or permanent styling until cleared by your surgeon  - No baths, hot tubs or pools for 4-6 weeks after surgery.     Wound cares after surgery  - You are ok to shower, but do not soak your incisions. Pat them dry if they get damp.   - Avoid coloring your hair or permanent styling until cleared by your surgeon  - No baths, hot tubs or pools for 4-6 weeks after surgery.       Call if you have any of the followin. Temperature greater than 101.5 F.   2. Any redness,  "swelling or discharge from the wound.   3. Any new weakness, numbness or altered mental status.  4. Worsening pain that is not improving with the pain medications you were prescribed.     Call [phone] or after 5:00 pm or on weekends call [phone] and ask for the neurosurgery resident on call. Thank You.            Wound care       You should remove your dressings and bandages on post-operative day #2. You should then keep the wound undressed and open to air. You are allowed to take showers and get the wound wet starting on post-operative day #3 but you may not scrub or soak the wound or keep it submerged under water. If you do happen to get the wound wet, be sure to pat dry it rather than scrubbing it with a towel.                  Follow-up Appointments     Adult [facility] Follow-up and recommended labs and tests       Follow up with Deepika Carlson PA-C in 2 weeks for wound check and at 6 weeks for post surgical check    Appointments on [place] and/or [facility] (with [facility] or [facility] provider or service). Call [phone] if you haven't heard regarding these appointments within 7 days of discharge.                  Pending Results     Date and Time Order Name Status Description    2/27/2018 0633 EKG 12-lead, tracing only Preliminary             Admission Information     Date & Time Provider Department Dept. Phone    2/27/2018 Elizabeth Buenrostro MD Unit [facility] [place] [phone]      Your Vitals Were     Blood Pressure Temperature Respirations Height Weight Pulse Oximetry    107/55 (BP Location: Right arm) 98.3  F (36.8  C) (Oral) 16 1.524 m (5') 57.7 kg (127 lb 3.3 oz) 96%    BMI (Body Mass Index)                   24.84 kg/m2           [facility] Information     [facility] lets you send messages to your doctor, view your test results, renew your prescriptions, schedule appointments and more. To sign up, go to [email or web address] . Click on \"Log in\" on the left side of the " "screen, which will take you to the Welcome page. Then click on \"Sign up Now\" on the right side of the page.     You will be asked to enter the access code listed below, as well as some personal information. Please follow the directions to create your username and password.     Your access code is: [email or web address]  Expires: 3/28/2018  6:31 AM     Your access code will  in 90 days. If you need help or a new code, please call your [place] clinic or [phone].        Care EveryWhere ID     This is your Care EveryWhere ID. This could be used by other organizations to access your [place] medical records  [id number]        Equal Access to Services     CISCO LEONG : Jose Khan, zulay patel, ladi young, vinay guerrier. So [facility] [phone].    ATENCIÓN: Si habla español, tiene a marinelli disposición servicios gratuitos de asistencia lingüística. Llame al [phone].    We comply with applicable federal civil rights laws and [place] laws. We do not discriminate on the basis of race, color, national origin, age, disability, sex, sexual orientation, or gender identity.               Review of your medicines      START taking        Dose / Directions    polyethylene glycol powder   Commonly known as:  MIRALAX   Used for:  S/P lumbar microdiscectomy        Dose:  1 capful   Take 17 g (1 capful) by mouth daily   Quantity:  510 g   Refills:  1         CONTINUE these medicines which may have CHANGED, or have new prescriptions. If we are uncertain of the size of tablets/capsules you have at home, strength may be listed as something that might have changed.        Dose / Directions    aspirin 81 MG chewable tablet   This may have changed:  medication strength   Used for:  Lumbar radiculopathy        Dose:  81 mg   Start taking on:  3/6/2018   Take 1 tablet (81 mg) by mouth daily   Quantity:  60 tablet   Refills:  11         CONTINUE these medicines which have NOT " CHANGED        Dose / Directions    FLUOXETINE HCL PO        Dose:  40 mg   Take 40 mg by mouth daily   Refills:  0       GABAPENTIN PO        Dose:  800 mg   Take 800 mg by mouth 4 times daily   Refills:  0       PRAVASTATIN SODIUM PO        Dose:  80 mg   Take 80 mg by mouth daily   Refills:  0       sennosides 8.6 MG tablet   Commonly known as:  SENOKOT        Dose:  1 tablet   Take 1 tablet by mouth daily   Refills:  0            Where to get your medicines      These medications were sent to [facility] [place], [place] - [address] [address]     Phone:  [phone]     polyethylene glycol powder         Some of these will need a paper prescription and others can be bought over the counter. Ask your nurse if you have questions.     Bring a paper prescription for each of these medications     aspirin 81 MG chewable tablet                Protect others around you: Learn how to safely use, store and throw away your medicines at www.disposemymeds.org.             Medication List: This is a list of all your medications and when to take them. Check marks below indicate your daily home schedule. Keep this list as a reference.      Medications           Morning Afternoon Evening Bedtime As Needed    aspirin 81 MG chewable tablet   Take 1 tablet (81 mg) by mouth daily   Start taking on:  3/6/2018                                FLUOXETINE HCL PO   Take 40 mg by mouth daily                                GABAPENTIN PO   Take 800 mg by mouth 4 times daily                                polyethylene glycol powder   Commonly known as:  MIRALAX   Take 17 g (1 capful) by mouth daily                                PRAVASTATIN SODIUM PO   Take 80 mg by mouth daily                                sennosides 8.6 MG tablet   Commonly known as:  SENOKOT   Take 1 tablet by mouth daily

## 2018-02-27 NOTE — OR NURSING
Pt has done very well.. VSS No c/o pain.. DELUCA withpout problems Interpretor here.. Sign outs et Report given.
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
## 2018-02-27 NOTE — ANESTHESIA POSTPROCEDURE EVALUATION
Patient: Layla Alcaraz    Procedure(s):  Left Minimally Invasive Lumbar 4-5 Hemilaminectomy And Microdiscectomy - Wound Class: I-Clean   - Wound Class: I-Clean    Diagnosis:Lumbar Disc Herniation With Radiculopathy      Diagnosis Additional Information: No value filed.    Anesthesia Type:  No value filed.    Note:  Anesthesia Post Evaluation    Patient location during evaluation: PACU  Patient participation: Able to fully participate in evaluation  Level of consciousness: awake and alert  Pain management: adequate  Airway patency: patent  Cardiovascular status: acceptable  Respiratory status: acceptable  Hydration status: acceptable  PONV: none     Anesthetic complications: None          Last vitals:  Vitals:    02/27/18 1130 02/27/18 1145 02/27/18 1200   BP: 124/85 136/88 119/80   Resp: 18 16 16   Temp: 36.2  C (97.2  F)     SpO2: 94% 95% 96%         Electronically Signed By: Ronnie Contreras MD  February 27, 2018  12:21 PM

## 2018-02-27 NOTE — OP NOTE
Procedure Date: 02/27/2018      DATE OF OPERATION:  02/27/2018      PREOPERATIVE DIAGNOSES:   1.  Left L5 radiculopathy due to left L4 to L5 disk herniation with lateral recess stenosis and degenerative scoliosis.   2.  History of T10 to T11 myelomalacia, not symptomatic.      POSTOPERATIVE DIAGNOSES:   1.  Left L5 radiculopathy due to left L4 to L5 disk herniation with lateral recess stenosis and degenerative scoliosis.   2.  History of T10 to T11 myelomalacia, not symptomatic.      PROCEDURES PERFORMED:     1.  Left L4 to L5 minimally-invasive hemilaminectomy and microdiskectomy.   2.  Intraoperative fluoroscopy.   3.  Intraoperative microscopy.      ATTENDING SURGEON:  Elizabeth Buenrostro MD      RESIDENT SURGEON:  Abdifatah Yates MD, PhD      ANESTHESIA:  General endotracheal anesthesia.      ESTIMATED BLOOD LOSS:  5 mL      COMPLICATIONS:  None.      IMPLANTS:  None.      INDICATIONS FOR PROCEDURE:  Mr. Layla Alcaraz is a 72-year-old male with a history of pain radiating down the left lateral aspect of his thigh and his lateral calf and dorsum of his foot.  The pain is precipitated by walking and movement.  It has been relatively persistent with minimal improvement with medications, as well as physical therapy.  Given his lack of response to conservative therapies, recommended the above procedure and he wished to proceed after a thorough discussion of the risks and benefits.  Of note, he has a history of an old thoracic injury with associated myelomalacia at T10 to T11.      DESCRIPTION OF PROCEDURE:  The patient was brought back to the operating room.  General anesthesia and endotracheal intubation were obtained by our Anesthesia colleagues.  Following intubation, the patient was rolled prone onto a flat Javier table with a radiolucent Kaleb frame.  Prior to flipping the patient, an arterial line was obtained to maintained MAPs  during the entire surgery to ensure adequate spinal cord perfusion due  to history of remote thoracic spine injury.  His arms were abducted to 90 degrees and all pressure points were appropriately padded.  The patient was prepped and draped in the usual sterile fashion.  A time-out was performed to verify the site and side of surgery.     The C-arm was brought in and the midline was identified, with the patient's known scoliosis visualized.  Then, approximately 1 cm offset on the left side was marked for the ideal paramedian skin incision location.  C-arm was then utilized for a lateral view to localize a 2cm incision overlying the L4-5 disc space.  An incision was marked approximately 1 cm left side paramedian midline, as well as 2 cm in vertical length.  Incision was infiltrated with 0.5% bupivacaine with epinephrine 1:200,000, a total of 8 mL was injected.   A #10 blade was used to open the skin incision.  Monopolar electrocautery was used to dissect the subcutaneous layer.  The fascia was opened using monopolar electrocautery.  Then, the smallest diameter METRx dilator tube was brought into the field and seated on the inferior aspect of the left L4 lamina at the spinous process-laminar junction.  The localization was confirmed again using lateral x-rays.  Sequential METRx dilator tubes were brought in and placed until the adequate diameter was reached to bring in the 4 cm x 20 mm diameter beveled tube, which was inserted and fixated using the MIS tube retracting system, with fluoroscopic verification of level.       The microscope was brought into the field.  Using a combination of monopolar electrocautery and blunt dissection, the overlying tissues were dissected away from the left L4 hemilamina.   A final fluoroscopic picture was obtained to again verify the location of the METRx tube over the L4 to L5 disk space prior to proceeding with bone removal.     The electric Anspach drill was brought into the field and the left L4 hemilaminectomy and partial medial facetectomy were  performed for lateral recess exposure.  The ligamentum flavum was then detached from the laminar insertionand resected from the lateral recess using Kerrison rongeurs.      The thecal sac was identified.  The thecal sac was gently retracted medially using a D'Errico retractor.  A disk bulge was noted in the underlying annulus.  A #15 blade was used to cut open the annulus and a bulging piece of disk was encountered.  The disk fragments were then removed in a piecemeal fashion using pituitary.  A downbiting Doreen curet was then used, as well as nerve hooks, to free up the underlying disk fragments that were protruding. The remaining compressive disk fragments were removed.  A blunt nerve hook was used to explore the area to verify that there was adequate decompression of the lateral recess.  Once adequate decompression was achieved, hemostasis was verified.  The METRx tube was then pulled out and again, hemostasis was verified.  The fascia was then closed using an 0 Vicryl stitch in a simple running fashion.  The subcutaneous layer was then reapproximated using 2-0 Vicryl sutures in a simple inverted interrupted fashion.  The skin was closed with 4-0 Monocryl in a running subcuticular fashion.  The incision was then cleaned with wet and dry sponges.  ChloraPrep followed by Dermabond were then applied.  A small Primapore dressing was then applied.  The patient was transferred supine to the hospital bed and then extubated for transport to the PACU for further recovery. Neurologic exam was stable upon awakening, with improvement in preoperative left leg pain. The patient was placed in observation status and later discharged home the same day after passing physical therapy evaluation for discharge.     Instrument, needle and sponge counts were correct x 2 at the end of the operation.      Dr. Elizabeth Buenrostro, Neurosurgery staff, was present and scrubbed for the key and critical portions of the case and was immediately  available during the remainder of the procedure.         BRDALEY BUENROSTRO MD       As dictated by JARRELL STILL MD, PHD            D: 2018   T: 2018   MT: KAROL      Name:     LAURYN JAIN   MRN:      [id number]        Account:        [id number]   :      1945           Procedure Date: 2018      Document: [id number]      Attestation statement:   I was scrubbed for the entire procedure from start of procedure to closure of the skin layer, for which I was immediately available. I performed all critical portions of the case.  Bradley Buenrostro MD    [facility] Department of Neurosurgery  Office: [phone]

## 2018-02-27 NOTE — PLAN OF CARE
Pt admitted at 1230 from [facility].  scheduled for 1630 to 1830 for Admission, PT consult, and discharge. Family had little information for admission before they left. Unable for RN to do this without the . Therapy asked for a  for eval as well.

## 2018-02-27 NOTE — PROGRESS NOTES
Dressing cdi.  Denies pain.  Eating walking and voiding at baseline.  Cms and neuros intact.  DC instructions reviewed with pt while  present.  Spoke with pt niece, to  pt ~ 1900.  Pt family arrived at 1830.  PIV removed.  Pt to DC to home when transport arrives.

## 2018-03-05 ENCOUNTER — TELEPHONE (OUTPATIENT)
Dept: NEUROSURGERY | Facility: CLINIC | Age: 73
End: 2018-03-05

## 2018-03-05 DIAGNOSIS — G89.18 PAIN FOLLOWING SURGERY OR PROCEDURE: Primary | ICD-10-CM

## 2018-03-05 RX ORDER — OXYCODONE HYDROCHLORIDE 5 MG/1
5 TABLET ORAL EVERY 6 HOURS PRN
Qty: 18 TABLET | Refills: 0 | Status: SHIPPED | OUTPATIENT
Start: 2018-03-05

## 2018-03-05 NOTE — TELEPHONE ENCOUNTER
Keshavr spoke with the pt's niece.  He pt is having a difficult time with pain.  He only has Tylenol and Ibuprofen for pain.  There is Oxycodone on his d/c paper work.  Niece claims that he did not receive the prescription.  Keshavr had [place] look through the paper work while on the phone with the writer.   called [facility] to verify if the prescription had been given to the pt.  The caller stated that there was no way to verify if the prescription has been given to the pt.   checked the discharge pharmacy and the Oxycodone script has not been filled in the [facility] system. Also, keshavr called the pharmacy listed in the chart and the prescription has not been filled.    will send a note to the MD.

## 2018-03-07 ENCOUNTER — TELEPHONE (OUTPATIENT)
Dept: NEUROSURGERY | Facility: CLINIC | Age: 73
End: 2018-03-07

## 2018-03-07 NOTE — TELEPHONE ENCOUNTER
The patient's niece called because no one called to set up a 2 week follow-up appointment with Deepika Carlson.    Writer gave the pt's niece Sofia's direct line.  If she does not hear back from Sofia by the end of the day to let the writer know.

## 2019-07-02 ENCOUNTER — TELEPHONE (OUTPATIENT)
Dept: PALLIATIVE MEDICINE | Facility: CLINIC | Age: 74
End: 2019-07-02

## 2019-07-02 NOTE — TELEPHONE ENCOUNTER
Referral received for New Patient Evaluation from Dr. Torres at [facility]. Patient is Walter speaking.       Routing for scheduling.         Radha Rodriguez    [facility]

## 2019-10-31 NOTE — TELEPHONE ENCOUNTER
Pain Management Center Referral      1. Confirmed address with patient? Yes  2. Confirmed phone number with patient? Yes  3. Confirmed referring provider? Yes  4. Is the PCP the same as the referring provider? Yes  5. Has the patient been to any previous pain clinics? No  (If yes, send [facility] with welcome letter)  6. Which insurance are we to bill for this appointment?  [employer] **replacement plan for both Medicare and [facility]  7. Informed pt of cancellation (48 hour) policy? Yes    REGARDING OPIOID MEDICATIONS: We will always address appropriateness of opioid pain medications, but we generally will not automatically take on a prescribing role. When we do take on prescribing of opioids for chronic pain, it is in collaboration with the referring physician for an intermediate period of time (months), with an expectation that the primary physician or provider will assume the prescribing role if medications are effective at stable doses with demonstrated compliance. Therefore, please do not assume that your prescribing responsibilities end on the day of pain clinic consultation.  8. Informed pt of prescribing policy? Yes    9.Please be aware that once you are established with a pain provider and location, you will need to continue have all future visits with that provider and location. It is best to determine what location is the most convenient for you and schedule with that one.    ** PATIENT INFORMED OF THIS POLICY Yes      9. Referring Provider: Emani Torres     10. Criteria for Triage Eval:   -Missed/Failed 1st DUAL appointment? N/A   -Medication Focused? N/A   -Mental Health Concerns? (e.g. Recent psych hospitalization/snap shot)? N/A   -Active substance abuse? N/A   -Patient behaviors (e.g. Offensive language/raised voice)? N/A

## 2019-11-12 ENCOUNTER — OFFICE VISIT (OUTPATIENT)
Dept: PALLIATIVE MEDICINE | Facility: CLINIC | Age: 74
End: 2019-11-12
Payer: COMMERCIAL

## 2019-11-12 VITALS — DIASTOLIC BLOOD PRESSURE: 75 MMHG | SYSTOLIC BLOOD PRESSURE: 122 MMHG | HEART RATE: 73 BPM

## 2019-11-12 DIAGNOSIS — M51.16 LUMBAR DISC HERNIATION WITH RADICULOPATHY: Primary | ICD-10-CM

## 2019-11-12 PROCEDURE — 99204 OFFICE O/P NEW MOD 45 MIN: CPT | Performed by: NURSE PRACTITIONER

## 2019-11-12 PROCEDURE — T1013 SIGN LANG/ORAL INTERPRETER: HCPCS | Mod: U3

## 2019-11-12 ASSESSMENT — PAIN SCALES - GENERAL: PAINLEVEL: MILD PAIN (3)

## 2019-11-12 NOTE — PATIENT INSTRUCTIONS
After Visit Instructions:     Thank you for coming to [facility] for your care. It is my goal to partner with you to help you reach your optimal state of health.     Continue daily self-care, identifying contributing factors, and monitoring variations in pain level. Continue to integrate self-care into your life.      1. Schedule follow-up with OMAR Pavon NP-C a few days after MRI   2. Imaging: MRI of lumbar spine  3. Procedures recommended: We will discuss options after MRI    4. Medication recommendations:   1. No changes to medications.     OMAR Otero, NP-C  [facility]  [facility]    Clinic Number:  [phone]     Call with any questions about your care and for scheduling assistance.     Calls are returned Monday through Friday between 8 AM and 4:30 PM. We usually get back to you within 2 business days depending on the issue/request.    If we are prescribing your medications:    For opioid medication refills, call the clinic or send a Cl[employer] message 7 days in advance.  Please include:    Name of requested medication    Name of the pharmacy.    For non-opioid medications, call your pharmacy directly to request a refill. Please allow 3-4 days to be processed.     Per [place] State Law:    All controlled substance prescriptions must be filled within 30 days of being written.      For those controlled substances allowing refills, pickup must occur within 30 days of last fill.      We believe regular attendance is key to your success in our program!      Any time you are unable to keep your appointment we ask that you call us at least 24 hours in advance to cancel.This will allow us to offer the appointment time to another patient.   Multiple missed appointments may lead to dismissal from the clinic.

## 2019-12-05 ENCOUNTER — ANCILLARY PROCEDURE (OUTPATIENT)
Dept: MRI IMAGING | Facility: CLINIC | Age: 74
End: 2019-12-05
Attending: NURSE PRACTITIONER

## 2019-12-05 DIAGNOSIS — M51.16 LUMBAR DISC HERNIATION WITH RADICULOPATHY: ICD-10-CM

## 2019-12-05 RX ORDER — GADOBUTROL 604.72 MG/ML
7.5 INJECTION INTRAVENOUS ONCE
Status: COMPLETED | OUTPATIENT
Start: 2019-12-05 | End: 2019-12-05

## 2019-12-05 RX ADMIN — GADOBUTROL 5.5 ML: 604.72 INJECTION INTRAVENOUS at 17:14

## 2019-12-05 NOTE — DISCHARGE INSTRUCTIONS
MRI Contrast Discharge Instructions    The IV contrast you received today will pass out of your body in your  urine. This will happen in the next 24 hours. You will not feel this process.  Your urine will not change color.    Drink at least 4 extra glasses of water or juice today (unless your doctor  has restricted your fluids). This reduces the stress on your kidneys.  You may take your regular medicines.    If you are on dialysis: It is best to have dialysis today.    If you have a reaction: Most reactions happen right away. If you have  any new symptoms after leaving the hospital (such as hives or swelling),  call your hospital at the correct number below. Or call your family doctor.  If you have breathing distress or wheezing, call 911.    Special instructions: ***    I have read and understand the above information.    Signature:______________________________________ Date:___________    Staff:__________________________________________ Date:___________     Time:__________    [facility] Departments:    ___[place]: [phone]  ___[facility]: [phone]  ___[place]: [phone] ___[facility]: [phone]  ___[facility]: [phone]  ___[facility]: [phone]  ___[phone]  ___[facility]: [phone]  ___[phone]

## 2020-02-17 ENCOUNTER — TELEPHONE (OUTPATIENT)
Dept: NEUROSURGERY | Facility: CLINIC | Age: 75
End: 2020-02-17

## 2020-02-17 ENCOUNTER — OFFICE VISIT (OUTPATIENT)
Dept: PALLIATIVE MEDICINE | Facility: CLINIC | Age: 75
End: 2020-02-17
Payer: COMMERCIAL

## 2020-02-17 VITALS
HEART RATE: 94 BPM | SYSTOLIC BLOOD PRESSURE: 116 MMHG | BODY MASS INDEX: 23.83 KG/M2 | DIASTOLIC BLOOD PRESSURE: 64 MMHG | WEIGHT: 122 LBS

## 2020-02-17 DIAGNOSIS — M51.16 LUMBAR DISC HERNIATION WITH RADICULOPATHY: Primary | ICD-10-CM

## 2020-02-17 DIAGNOSIS — M54.16 COMPRESSION OF LUMBAR NERVE ROOT: ICD-10-CM

## 2020-02-17 PROCEDURE — 99213 OFFICE O/P EST LOW 20 MIN: CPT | Performed by: NURSE PRACTITIONER

## 2020-02-17 RX ORDER — BENZONATATE 100 MG/1
100 CAPSULE ORAL 3 TIMES DAILY PRN
COMMUNITY

## 2020-02-17 ASSESSMENT — PAIN SCALES - GENERAL: PAINLEVEL: MODERATE PAIN (4)

## 2020-02-17 NOTE — PROGRESS NOTES
[facility]    CHIEF COMPLAINT: back pain with LLE radiculopathy    INTERVAL HISTORY:  Last seen on 11/12/20.        Recommendations/plan at the last visit included:     1. Schedule follow-up with OMAR Pavon NP-C a few days after MRI   2. Imaging: MRI of lumbar spine  3. Procedures recommended: We will discuss options after MRI   4. Medication recommendations:        1. No changes to medications.     Since last visit:   - Having fecal incontinence. Was rare issue but went through a time where incontinence was more frequent. He is unclear about the frequency today.    -Here to review his MRI.    Pain Information:   Pain quality: Aching and Penetrating    Pain rating: intensity ranges from 3/10 to 10/10, and averages 7/10 on a 0-10 scale.      Annual Controlled Substance Agreement/UDS due date: N/A    Current Pain Relevant Medications:    Tylenol 500 mg (?) intermittent use  Gabapentin 800 mg at HS      Previous Pain Relevant Medications: (H--helped; HI--Helped initially; S[facility]--Somewhat helpful; NH--No help; W--worse; SE--side effects; ?--Unsure if helpful)   NOTE: This medication information taken from patient's intake form, not medical records.               Opiates: Oxycodone:H (Post op) H              NSAIDS: none               Muscle Relaxants: none               Anti-migraine mediations: none              Anti-depressants: Prozac:H               Sleep aids:none              Anxiolytics: none              Neuropathics: Gabapentin:[facility]                      Topicals: OTC cream, unsure of name (?)               Other medications not covered above: Tylenol:NH         Any illicit drug use: none  EtOH use: none  Caffeine use: 3-5 daily   Nicotine use: none   Any use of prescriptions other than how they were prescribed:none        THE 4 As OF OPIOID MAINTENANCE ANALGESIA    Analgesia: Is pain relief clinically significant? N/A   Activity: Is patient functional and able to perform Activities  of Daily Living? N/A   Adverse effects: Is patient free from adverse side effects from opiates? N/A   Adherence to Rx protocol: Is patient adhering to Controlled Substance Agreement and taking medications ONLY as ordered? N/A     Is Narcan prescribed for opiate use >50 MME daily? N/A     [place] Board of Pharmacy Data Base Reviewed:    YES; No concern for abuse or misuse of controlled medications based on this report.     Medications:  Current Outpatient Medications   Medication Sig Dispense Refill     benzonatate (TESSALON) 100 MG capsule Take 100 mg by mouth 3 times daily as needed for cough       calcium carbonate 600 mg-vitamin D 400 units (CALTRATE) 600-400 MG-UNIT per tablet Take 1 tablet by mouth 2 times daily       FLUOXETINE HCL PO Take 40 mg by mouth daily       GABAPENTIN PO Take 800 mg by mouth 4 times daily        PRAVASTATIN SODIUM PO Take 80 mg by mouth daily        sennosides (SENOKOT) 8.6 MG tablet Take 1 tablet by mouth daily       aspirin 81 MG chewable tablet Take 1 tablet (81 mg) by mouth daily (Patient not taking: Reported on 2/17/2020) 60 tablet 11     oxyCODONE IR (ROXICODONE) 5 MG tablet Take 1 tablet (5 mg) by mouth every 6 hours as needed for pain (One tablet every 6 -8 hours for moderate pain.) maximum 10 tablet(s) per day (Patient not taking: Reported on 11/12/2019) 18 tablet 0     polyethylene glycol (MIRALAX) powder Take 17 g (1 capful) by mouth daily (Patient not taking: Reported on 11/12/2019) 510 g 1       Review of Systems: A 10-point review of systems was negative, with the exception of chronic pain issues.     PHYSICAL EXAM    Vitals:    02/17/20 1521   BP: 116/64   Pulse: 94   Weight: 55.3 kg (122 lb)       Constitutional: healthy, alert and no distress  HEENT: Head atraumatic, normocephalic. Eyes without conjunctival injection or jaundice. Neck supple. No obvious neck masses.  Cardiovascular: No edema or JVD appreciated.  Skin: No rash, lesions, or petechiae of exposed skin.    Extremities: No clubbing, cyanosis, or edema to exposed extremities  Psychiatric/mental status: Alert, without lethargy or stupor. Appropriate affect. Mood normal.   Neurologic exam:  CN:  Cranial nerves 2-12 are grossly normal.    Musculoskeletal exam:  Thoracic spine:              Kyphosis.  Mild             Tenderness in the thoracic spine at midline. No             Tenderness in the thoracic paraspinal muscles. No  Lumbar/Sacral spine:             Forward Flexion:  90 degrees, pain free             Ext: 30 degrees, pain free             Rotation/ext to right: pain free             Rotation/ext to left: pain free             Lordosis. No             Tenderness in the lumbar spine at midline. No             Tenderness in the lumbar paraspinal muscles.No     Psychiatric:  mentation appears normal., affect and mood normal     DIRE Score for ongoing opioid management is calculated as follows:    Diagnosis = 2 pts (slowly progressive; moderate pain/objective findings)    Intractability = 1 pt (few therapies tried; passive patient role)    Risk        Psych = 3 pts (no significant personality dysfunction/mental illness; good communication with clinic)         Chem Hlth = 3 pts (no history of chemical dependency; not drug-focused)       Reliability = 3 pts (highly reliable with meds, appointments, treatments)       Social = 2 pts (reduction in some relationships/life rolls)       (Psych + Chem hlth + Reliability + Social) = 14    Efficacy = 2 pts (moderate benefit/function; low med dose; too early/not tried meds)    DIRE Score = 16       7-13: likely NOT suitable candidate for long-term opioid analgesia       14-21: may be a suitable candidate for long-term opioid analgesia     Previous Diagnostic Tests:   Imaging Studies:   MR LUMBAR SPINE W/O & W CONTRAST 12/5/2019  Provided History: Back pain, prior surgery, new or progressive sx;  2/27/2018: Left Minimally Invasive Lumbar 4-5 Hemilaminectomy And  Microdiscectomy,  axial pain relieved, LLE radiculopathy same.; Lumbar  disc herniation with radiculopathy  ICD-10: Lumbar disc herniation with radiculopathy  Comparison: Lumbar MRI dated 9/19/2017  Technique: Sagittal T1-weighted, sagittal STIR, 3D volumetric axial  and sagittal reconstructed T2-weighted images of the lumbar spine were  obtained without and with intravenous contrast.   Findings: Postoperative changes of left hemilaminectomy and  microdiscectomy at the level of L4-5. Counting down from C2, there are  5 lumbar-type vertebra.  The tip of the conus medullaris is at L1.   Leftward convex scoliosis centered at L2.  There is disc height  narrowing at L1-2, L2-3, L4-5 and L5-S1.   Normal marrow signal.  On a level by level basis:  T12-L1: Diffuse disc bulge with questionable annulus fissure. Disc  osteophyte complex. Mild spinal canal stenosis. No significant  neuroforaminal stenosis.  L1-2: Right eccentric disc bulge and disc osteophyte complex. Facet  arthropathy bilaterally. Spinal canal is patent.. Mild to moderate  right and mild left neural foraminal stenosis.  L2-3: Circumferential disc bulge with a superimposed right foraminal  disc protrusion, which narrows the lateral recesses and likely  impinges upon the traversing right L3 nerve root.. Endplate spurring.  Mild spinal canal stenosis. Moderate left and mild right  neuroforaminal stenosis.  L3-4: Disc osteophyte complex narrows the lateral recesses and likely  abuts and possibly impinges upon the traversing left L4 nerve root..  Facet arthropathy. Ligamentum flavum hypertrophy. No significant  spinal canal stenosis. Moderate severe left and moderate right  neuroforaminal stenosis.  L4-5: Postoperative changes of left hemilaminectomy and discectomy.  Circumferential disc bulge narrows the lateral recesses and may  impinge upon the traversing L5 nerve roots bilaterally. Asymmetric  right subarticular/foraminal soft tissue density protrusion with mild  contrast  enhancement abutting right ventricle thecal sac and spinal  cord, likely postoperative scarring. Moderate spinal canal stenosis.  Moderate left, mild to moderate right neuroforaminal stenosis.  L5-S1: Disc osteophyte complex and facet arthropathy. Probable annular  fissure (images 37 series 18). Mild spinal canal stenosis. Moderate  right and moderate to severe left neuroforaminal stenosis.  Left predominant paraspinous soft tissue enhancement at L4-5. There is  facet enhancement and edema at L4-5 and L5-S1 bilaterally, likely due  to inflammatory facet arthropathy.  Impression: 1. Postoperative changes of left hemilaminectomy and  discectomy at L4-5. Asymmetric right subarticular/foraminal soft  tissue density with mild contrast enhancement abutting right lateral  recess, traversing left L5 nerve root, likely represents postoperative  scarring. Additionally, there is moderate spinal canal stenosis.  Moderate left, mild to moderate right neuroforaminal stenosis at the  same level.  2. Extensive multilevel lumbar spondylosis with leftward convex  scoliosis. Left greater than right moderate to severe neuroforaminal  stenosis at L3-4, and water to severe left and moderate right  neuroforaminal stenosis at L5-S1.  3. Active inflammatory arthropathy involving the facet joints of the  lower lumbar spine bilaterally.     ASSESSMENT:   1.  Chronic low back pain with LLE radiculopathy s/p L4-5 hemilaminectomy/discectomy.  Left L5 nerve compression    Layla Alcaraz returns to clinic accompanied by .  We reviewed his MRI and discussed potential procedures to help with his radicular pain.  Given his new onset of some episodes of fecal incontinence I would like him to see his neurosurgeon, Dr. Buenrostro, before we determine any injections.  He still has quite a bit of degenerative disease throughout the spine that may be causing his symptoms and additional to L5 nerve root involvement.     I contacted the  neurosurgery clinic today as he has had difficulty getting an appointment scheduled.  They left a message on his voicemail for him today to make that appointment.  I let him know this and he will have 1 of the staff at his adult  call to schedule the neurosurgery appointment and follow-up with me.    Plan:    Diagnosis reviewed, treatment option addressed, and risk/benifits discussed.  Self-care instructions given.  I am recommending a multidisciplinary treatment plan to help this patient better manage pain.        1. Schedule follow-up with OMAR Pavon, NP-C in 1 week after neurosurgery appointment. Please call to make appt with Emani as soon as you have the date for your neurosurgery appt.   2. Procedures recommended: We will do an steroid injection for your pain if neurosurgery says it's ok.     A total of 15 minutes was spent on the patient today, greater than 50% of that time was spent on face to face counseling and care coordination regarding diagnoses and treatment options as mentioned above including the multidisciplinary pain management program and the benefits of physical therapy, pain psychology and medication options.      OMAR Otero, NPJhonnyC  [facility]

## 2020-02-17 NOTE — Clinical Note
Dr. Torres,Neurosurgery has contacted him and he will have someone from his  return the call to make an appointment. OMAR Otero, NP-[facility] [place]

## 2020-02-18 ENCOUNTER — TELEPHONE (OUTPATIENT)
Dept: NEUROSURGERY | Facility: CLINIC | Age: 75
End: 2020-02-18

## 2020-02-18 NOTE — TELEPHONE ENCOUNTER
Writer called mobile phone. Non English speaking male answered. Noted in patient chart BOB  needed.

## 2020-02-18 NOTE — TELEPHONE ENCOUNTER
I saw patient today in the pain center.  Through his  he told me that he will have 1 of the staff at his adult  return your call tomorrow to schedule the appointment.  Thank you so much for getting in touch with him.    OMAR Otero, NP-C  [facility] [place]

## 2020-02-18 NOTE — TELEPHONE ENCOUNTER
Writer contacted Adult  phone number in [employer]; left voice mail to contact writer.   Provided writer's direct dial

## 2020-02-19 ENCOUNTER — TELEPHONE (OUTPATIENT)
Dept: NEUROSURGERY | Facility: CLINIC | Age: 75
End: 2020-02-19

## 2020-02-19 ENCOUNTER — DOCUMENTATION ONLY (OUTPATIENT)
Dept: CARE COORDINATION | Facility: CLINIC | Age: 75
End: 2020-02-19

## 2020-02-19 NOTE — TELEPHONE ENCOUNTER
Writer called Young at pt's adult day care regarding an appt to see a provider in neurosurgery.  Message left.

## 2020-02-19 NOTE — TELEPHONE ENCOUNTER
Provider reviewed MRI and writer spoke with pt.  With the use of an , pt stated that he has only been incontinence once in the beginning of February.  He has urgency and could not make it to the [facility] on time.  There has been no incontinence of bowel or bladder since the beginning of February.  He only has pain right now.  He had no incontinence a year ago prior to his surgery with neurosurgeon.  Pt will see the DNP in clinic tomorrow morning.  Pt was offered a 9am and 10am appt.  Pt is dependent on a  and he does not know which time will work for his .  Writer told him to come to the clinic at any time and he would be worked in.  Through the , Pt expressed understanding of the information given.

## 2020-02-19 NOTE — TELEPHONE ENCOUNTER
Writer spoke with Bryce walsh to contact patient for appointment with Akilah Meléndez.   Writer on the phone with  and patient.   Appointment: 02/21/2020  Time: 0800  Location: [address] [place]    informed patient.   Writer answered patient questions.   Understanding expressed via .

## 2020-02-19 NOTE — TELEPHONE ENCOUNTER
Appointment confirmed via [place] speaking representative to coordinate appointment with male  for appointment and appointment confirm with patient.

## 2020-02-20 NOTE — TELEPHONE ENCOUNTER
With the help of an  the writer reached out to the pt to confirm that he would be in clinic this morning.  Pt said that he does not have a ride this AM.  Prior to calling the pt, writer called pt's friend and left a message.  Writer left a message yesterday as well and has not heard back.  In addition, yesterday the writer left a message for Young at the pt's adult day care and has not heard back.    Pt will reach out to the  and ask the  to put him on DNP's schedule for early next week.

## 2020-02-21 ENCOUNTER — DOCUMENTATION ONLY (OUTPATIENT)
Dept: NEUROSURGERY | Facility: CLINIC | Age: 75
End: 2020-02-21

## 2020-02-21 NOTE — PROGRESS NOTES
2/20/2020  Per :     We received your urgent message about this patient and my team has worked diligently to get him an appointment; their repeated efforts are documented in the chart.     I personally reviewed this patient's MRI lumbar spine. There are no new concerning findings to indicate surgical intervention is needed; his stenosis is present but not significantly progressed from 2017. I think it would be fine to have an epidural steroid injection for him.     On our questioning, patient does not have symptoms of stool incontinence that would be concerning for due to spinal pathology. Instead, he has had one episode of stool urgency where he knew that he needed to void but could not get to the bathroom fast enough. He has only had one of these episodes, which my nurse personally confirmed with the patient via .     We offered nonetheless to see the patient today, and he was unable to make it due to transportation issues. We'd be happy to see him in the near future to make sure he is stable from a lumbar spine perspective. My team has left numerous messages with the patient and with a friend he has listed as an approved contact; we have also called his adult day care to try to facilitate scheduling for him to see us.     We'd be happy to have him visit with Akilah Meléndez, our excellent NP, to make sure there are no concerning neurologic processes.     I do note that the patient has never once returned for any of his scheduled postoperative followup visits with me. We have left messages with our contact information at every possible place in order to help him schedule a followup with us.     My best regards,   Elizabeth Buenrostro

## 2020-02-28 ENCOUNTER — OFFICE VISIT (OUTPATIENT)
Dept: NEUROSURGERY | Facility: CLINIC | Age: 75
End: 2020-02-28

## 2020-02-28 VITALS
OXYGEN SATURATION: 95 % | RESPIRATION RATE: 16 BRPM | SYSTOLIC BLOOD PRESSURE: 120 MMHG | HEART RATE: 84 BPM | DIASTOLIC BLOOD PRESSURE: 74 MMHG

## 2020-02-28 DIAGNOSIS — M54.16 LUMBAR RADICULOPATHY, ACUTE: Primary | ICD-10-CM

## 2020-02-28 NOTE — LETTER
"     RE: Layla Alcaraz  [address]  [facility] [facility] [id number]     Dear Colleague,    Thank you for referring your patient, Layla Alcaraz, to the [facility] NEUROSURGERY at [facility]. Please see a copy of my visit note below.    2/28/2020     Neurosurgery Clinic Note  Video Icelandic intrepreter     Reason for visit:         Left lower extremity lumbar radiculopathy     History of present illness:    Kieran is a 72 year old man who with hx of Left Minimally Invasive Lumbar 4-5 Hemilaminectomy And Microdiscectomy in February of 2018.   He continues to C/o left leg pain that radiates down the leg.  He pimentel not have back pain  Symptoms come and go   Describes as a \"burning pain\" down leg   No numbness or tingling.    No bladder incontinence.   No bowel incontinence.    He did not have physical therapy following his surgery    Current Pain Medications:    Current Outpatient Medications   Medication     aspirin 81 MG chewable tablet                          Out of medication      benzonatate (TESSALON) 100 MG capsule     calcium carbonate 600 mg-vitamin D 400 units (CALTRATE) 600-400 MG-UNIT per tablet     GABAPENTIN PO     polyethylene glycol (MIRALAX) powder     PRAVASTATIN SODIUM PO       Review of systems:  A ROS negative except for as detailed in HPI  Past Medical History:   Diagnosis Date     Hearing loss      Hyperlipidemia      Lumbar spondylosis      Surgical History:   Past Surgical History:   Procedure Laterality Date     COLONOSCOPY       DISCECTOMY LUMBAR MINIMALLY INVASIVE ONE LEVEL Left 2/27/2018     LAMINECTOMY LUMBAR MINIMALLY INVASIVE ONE LEVEL Left 2/27/2018    Procedure: LAMINECTOMY LUMBAR MINIMALLY INVASIVE ONE LEVEL;  Left Minimally Invasive Lumbar 4-5 Hemilaminectomy And Microdiscectomy;  Surgeon: Elizabeth Buenrostro MD;  Location: [facility] OR       Physical exam:   /74   Pulse 84   Resp 16   SpO2 95%     General: Awake and " alert and in no acute distress.  Pulm: Breathing comfortably on room air  CN: Grossly intact.   Motor: No pronator drift.   Good muscle bulk throughout.   Bilateral lower extremities    5/5 including DF/PF/EHL  Able to heel walk / toe walk   Gait: Intact tandem gait. Negative Romberg  Reflexes:  No Hyper-reflexia or ankle clonus     Imaging:   MR LUMBAR SPINE W/O & W CONTRAST 12/5/2019 5:45 PM    Findings: Postoperative changes of left hemilaminectomy and  microdiscectomy at the level of L4-5. Counting down from C2, there are  5 lumbar-type vertebra.  The tip of the conus medullaris is at L1.   Leftward convex scoliosis centered at L2.  There is disc height  narrowing at L1-2, L2-3, L4-5 and L5-S1.   Normal marrow signal.     T12-L1: Diffuse disc bulge with questionable annulus fissure. Disc  osteophyte complex. Mild spinal canal stenosis. No significant  neuroforaminal stenosis.     L1-2: Right eccentric disc bulge and disc osteophyte complex. Facet  arthropathy bilaterally. Spinal canal is patent.. Mild to moderate  right and mild left neural foraminal stenosis.     L2-3: Circumferential disc bulge with a superimposed right foraminal  disc protrusion, which narrows the lateral recesses and likely  impinges upon the traversing right L3 nerve root.. Endplate spurring.  Mild spinal canal stenosis. Moderate left and mild right  neuroforaminal stenosis.     L3-4: Disc osteophyte complex narrows the lateral recesses and likely  abuts and possibly impinges upon the traversing left L4 nerve root..  Facet arthropathy. Ligamentum flavum hypertrophy. No significant  spinal canal stenosis. Moderate severe left and moderate right  neuroforaminal stenosis.     L4-5: Postoperative changes of left hemilaminectomy and discectomy.  Circumferential disc bulge narrows the lateral recesses and may  impinge upon the traversing L5 nerve roots bilaterally. Asymmetric  right subarticular/foraminal soft tissue density protrusion with  mild  contrast enhancement abutting right ventricle thecal sac and spinal  cord, likely postoperative scarring. Moderate spinal canal stenosis.  Moderate left, mild to moderate right neuroforaminal stenosis.     L5-S1: Disc osteophyte complex and facet arthropathy. Probable annular  fissure (images 37 series 18). Mild spinal canal stenosis. Moderate  right and moderate to severe left neuroforaminal stenosis.     Left predominant paraspinous soft tissue enhancement at L4-5. There is  facet enhancement and edema at L4-5 and L5-S1 bilaterally, likely due  to inflammatory facet arthropathy.                                                               Impression: 1. Postoperative changes of left hemilaminectomy and  discectomy at L4-5. Asymmetric right subarticular/foraminal soft  tissue density with mild contrast enhancement abutting right lateral  recess, traversing left L5 nerve root, likely represents postoperative  scarring. Additionally, there is moderate spinal canal stenosis.  Moderate left, mild to moderate right neuroforaminal stenosis at the  same level.  2. Extensive multilevel lumbar spondylosis with leftward convex  scoliosis. Left greater than right moderate to severe neuroforaminal  stenosis at L3-4, and water to severe left and moderate right  neuroforaminal stenosis at L5-S1.  3. Active inflammatory arthropathy involving the facet joints of the  lower lumbar spine bilaterally.     I have personally reviewed the examination and initial interpretation  and I agree with the findings.     MARGARET WADDELL MD    Assessment:             Left lower extremity lumbar radiculopathy of L5     Plan:  -Epidural steroid Injection at L4-L5 targeted to the left L5  ~Return to clinic after injection to update his progress     Akilah Meléndez DNP  Neurosurgery Nurse Practitioner  [facility]  [phone]

## 2020-02-28 NOTE — NURSING NOTE
Chief Complaint   Patient presents with     Follow Up     [facility] RETURN CHRONIC LEFT SIDED LOW BACK PAIN       Tim Caputo

## 2021-06-03 ENCOUNTER — MEDICAL CORRESPONDENCE (OUTPATIENT)
Dept: HEALTH INFORMATION MANAGEMENT | Facility: CLINIC | Age: 76
End: 2021-06-03

## 2021-06-03 ENCOUNTER — TRANSCRIBE ORDERS (OUTPATIENT)
Dept: OTHER | Age: 76
End: 2021-06-03

## 2021-06-03 DIAGNOSIS — H53.9 VISION CHANGES: Primary | ICD-10-CM

## 2021-07-01 ENCOUNTER — OFFICE VISIT (OUTPATIENT)
Dept: OPHTHALMOLOGY | Facility: CLINIC | Age: 76
End: 2021-07-01
Attending: INTERNAL MEDICINE
Payer: COMMERCIAL

## 2021-07-01 DIAGNOSIS — H35.3134 ADVANCED ATROPHIC NONEXUDATIVE AGE-RELATED MACULAR DEGENERATION OF BOTH EYES WITH SUBFOVEAL INVOLVEMENT: ICD-10-CM

## 2021-07-01 DIAGNOSIS — H53.9 VISION CHANGES: ICD-10-CM

## 2021-07-01 DIAGNOSIS — H52.223 REGULAR ASTIGMATISM OF BOTH EYES: Primary | ICD-10-CM

## 2021-07-01 DIAGNOSIS — Z96.1 PSEUDOPHAKIA OF BOTH EYES: ICD-10-CM

## 2021-07-01 DIAGNOSIS — H18.413 ARCUS SENILIS OF BOTH EYES: ICD-10-CM

## 2021-07-01 PROCEDURE — 92134 CPTRZ OPH DX IMG PST SGM RTA: CPT | Performed by: OPTOMETRIST

## 2021-07-01 PROCEDURE — 92015 DETERMINE REFRACTIVE STATE: CPT | Performed by: OPTOMETRIST

## 2021-07-01 PROCEDURE — 92004 COMPRE OPH EXAM NEW PT 1/>: CPT | Performed by: OPTOMETRIST

## 2021-07-01 RX ORDER — TETRAHYDROZOLINE HCL 0.05 %
1 DROPS OPHTHALMIC (EYE) 3 TIMES DAILY
COMMUNITY

## 2021-07-01 ASSESSMENT — VISUAL ACUITY
OS_CC: J2
OS_CC: 20/25
OD_CC: 20/30
CORRECTION_TYPE: GLASSES
OD_CC: J2
OS_CC+: -2
METHOD: SNELLEN - LINEAR
OD_CC+: -2

## 2021-07-01 ASSESSMENT — REFRACTION_WEARINGRX
SPECS_TYPE: PAL
OD_SPHERE: -1.25
OD_CYLINDER: +2.00
OD_AXIS: 180
OD_ADD: +2.75
OS_CYLINDER: +2.00
OS_SPHERE: -1.25
OS_AXIS: 176
OS_ADD: +2.75

## 2021-07-01 ASSESSMENT — REFRACTION_MANIFEST
OD_SPHERE: -1.75
OS_SPHERE: -1.00
OD_AXIS: 002
OD_CYLINDER: +2.50
OS_AXIS: 178
OS_ADD: +2.75
OS_CYLINDER: +2.00
OD_ADD: +2.75

## 2021-07-01 ASSESSMENT — TONOMETRY
OS_IOP_MMHG: 09
OD_IOP_MMHG: 11
IOP_METHOD: ICARE

## 2021-07-01 ASSESSMENT — SLIT LAMP EXAM - LIDS
COMMENTS: NORMAL
COMMENTS: NORMAL

## 2021-07-01 ASSESSMENT — CONF VISUAL FIELD
OS_NORMAL: 1
OD_NORMAL: 1
METHOD: COUNTING FINGERS

## 2021-07-01 ASSESSMENT — EXTERNAL EXAM - RIGHT EYE: OD_EXAM: NORMAL

## 2021-07-01 ASSESSMENT — CUP TO DISC RATIO
OS_RATIO: 0.2
OD_RATIO: 0.2

## 2021-07-01 ASSESSMENT — EXTERNAL EXAM - LEFT EYE: OS_EXAM: NORMAL

## 2021-07-01 NOTE — NURSING NOTE
Chief Complaints and History of Present Illnesses   Patient presents with     COMPREHENSIVE EYE EXAM     Eye exam with recommended by PMD for routine care.     Chief Complaint(s) and History of Present Illness(es)     COMPREHENSIVE EYE EXAM     Laterality: both eyes    Associated symptoms: Negative for dryness, eye pain, redness and tearing    Treatments tried: no treatments    Pain scale: 0/10    Comments: Eye exam with recommended by PMD for routine care.              Comments      He report not having any issues with glasses of 1 year. Seeing fine both distance and near.     Using clear eyes gtts Q D each eye.       TRAVON Lau COT 9:12 AM July 1, 2021

## 2021-07-01 NOTE — PROGRESS NOTES
History  HPI     COMPREHENSIVE EYE EXAM     In both eyes.  Associated symptoms include Negative for dryness, eye pain, redness and tearing.  Treatments tried include no treatments.  Pain was noted as 0/10. Additional comments: Eye exam with recommended by PMD for routine care.              Comments      He report not having any issues with glasses of 1 year. Seeing fine both distance and near.     Using clear eyes gtts Q D each eye.       TRAVON Lau COT 9:12 AM July 1, 2021                   Last edited by Cherise More COT on 7/1/2021  9:12 AM. (History)          Assessment/Plan  (H52.223) Regular astigmatism of both eyes  (primary encounter diagnosis)  Comment: Mixed astigmatism both eyes with presbyopia  Plan: REFRACTION         Educated patient on condition and clinical findings. Dispensed spectacle prescription for full time wear. Monitor annually.    (H18.413) Arcus senilis of both eyes  Comment: Not visually significant  Plan:  No treatment indicated at this time. Monitor annually.    (Z96.1) Pseudophakia of both eyes  Comment: Clear lenses both eyes   Plan:  No treatment indicated at this time. Monitor annually.    (H35.8954) Advanced atrophic nonexudative age-related macular degeneration of both eyes with subfoveal involvement  Comment: Extensive drusenoid changes both eyes with PED left eye   Plan: OCT Retina Spectralis OU (both eyes)   Referred to Dr. Whitley for consultation.    (H53.9) Vision changes  Comment: Referred for eye exam  Plan:  Copy of chart sent to Dr. Torres.    Complete documentation of historical and exam elements from today's encounter can  be found in the full encounter summary report (not reduplicated in this progress  note). I personally obtained the chief complaint(s) and history of present illness. I  confirmed and edited as necessary the review of systems, past medical/surgical  history, family history, social history, and examination findings as documented  by  others; and I examined the patient myself. I personally reviewed the relevant tests,  images, and reports as documented above. I formulated and edited as necessary the  assessment and plan and discussed the findings and management plan with the  patient and family.    Art Fuchs OD, FAAO

## 2021-07-04 PROBLEM — Z96.1 PSEUDOPHAKIA: Status: ACTIVE | Noted: 2021-07-04

## 2022-02-17 PROBLEM — H35.363 DRUSEN OF MACULA OF BOTH EYES: Status: ACTIVE | Noted: 2021-07-04

## 2022-09-06 ENCOUNTER — LAB REQUISITION (OUTPATIENT)
Dept: LAB | Facility: CLINIC | Age: 77
End: 2022-09-06
Payer: COMMERCIAL

## 2022-09-06 DIAGNOSIS — Z00.00 ENCOUNTER FOR GENERAL ADULT MEDICAL EXAMINATION WITHOUT ABNORMAL FINDINGS: ICD-10-CM

## 2022-09-06 LAB
ALBUMIN SERPL BCG-MCNC: 4.4 G/DL (ref 3.5–5.2)
ALP SERPL-CCNC: 55 U/L (ref 40–129)
ALT SERPL W P-5'-P-CCNC: 13 U/L (ref 10–50)
ANION GAP SERPL CALCULATED.3IONS-SCNC: 12 MMOL/L (ref 7–15)
AST SERPL W P-5'-P-CCNC: 37 U/L (ref 10–50)
BILIRUB SERPL-MCNC: 0.7 MG/DL
BUN SERPL-MCNC: 13.3 MG/DL (ref 8–23)
CALCIUM SERPL-MCNC: 9.4 MG/DL (ref 8.8–10.2)
CHLORIDE SERPL-SCNC: 102 MMOL/L (ref 98–107)
CHOLEST SERPL-MCNC: 141 MG/DL
CREAT SERPL-MCNC: 1.06 MG/DL (ref 0.67–1.17)
DEPRECATED HCO3 PLAS-SCNC: 25 MMOL/L (ref 22–29)
GFR SERPL CREATININE-BSD FRML MDRD: 72 ML/MIN/1.73M2
GLUCOSE SERPL-MCNC: 96 MG/DL (ref 70–99)
HDLC SERPL-MCNC: 47 MG/DL
LDLC SERPL CALC-MCNC: 63 MG/DL
NONHDLC SERPL-MCNC: 94 MG/DL
POTASSIUM SERPL-SCNC: 4.8 MMOL/L (ref 3.4–5.3)
PROT SERPL-MCNC: 7.7 G/DL (ref 6.4–8.3)
PSA SERPL-MCNC: 4.75 NG/ML (ref 0–6.5)
SODIUM SERPL-SCNC: 139 MMOL/L (ref 136–145)
TRIGL SERPL-MCNC: 156 MG/DL
TSH SERPL DL<=0.005 MIU/L-ACNC: 1.19 UIU/ML (ref 0.3–4.2)

## 2022-09-06 PROCEDURE — 84443 ASSAY THYROID STIM HORMONE: CPT | Mod: ORL | Performed by: NURSE PRACTITIONER

## 2022-09-06 PROCEDURE — 82306 VITAMIN D 25 HYDROXY: CPT | Mod: ORL | Performed by: NURSE PRACTITIONER

## 2022-09-06 PROCEDURE — 80061 LIPID PANEL: CPT | Performed by: NURSE PRACTITIONER

## 2022-09-06 PROCEDURE — 80053 COMPREHEN METABOLIC PANEL: CPT | Mod: ORL | Performed by: NURSE PRACTITIONER

## 2022-09-06 PROCEDURE — 84153 ASSAY OF PSA TOTAL: CPT | Mod: ORL | Performed by: NURSE PRACTITIONER

## 2022-09-07 LAB — DEPRECATED CALCIDIOL+CALCIFEROL SERPL-MC: 39 UG/L (ref 20–75)

## 2022-09-08 ENCOUNTER — TRANSCRIBE ORDERS (OUTPATIENT)
Dept: OTHER | Age: 77
End: 2022-09-08

## 2022-09-08 DIAGNOSIS — M54.42 CHRONIC LEFT-SIDED LOW BACK PAIN WITH LEFT-SIDED SCIATICA: Primary | ICD-10-CM

## 2022-09-08 DIAGNOSIS — G89.29 CHRONIC LEFT-SIDED LOW BACK PAIN WITH LEFT-SIDED SCIATICA: Primary | ICD-10-CM

## 2022-09-15 ENCOUNTER — TRANSCRIBE ORDERS (OUTPATIENT)
Dept: OTHER | Age: 77
End: 2022-09-15

## 2022-09-15 DIAGNOSIS — M79.644 FINGER PAIN, RIGHT: Primary | ICD-10-CM

## 2022-09-22 ENCOUNTER — ANCILLARY PROCEDURE (OUTPATIENT)
Dept: MRI IMAGING | Facility: CLINIC | Age: 77
End: 2022-09-22
Attending: NURSE PRACTITIONER
Payer: COMMERCIAL

## 2022-09-22 DIAGNOSIS — G89.29 CHRONIC LOW BACK PAIN WITH LEFT-SIDED SCIATICA: ICD-10-CM

## 2022-09-22 DIAGNOSIS — M54.42 CHRONIC LOW BACK PAIN WITH LEFT-SIDED SCIATICA: ICD-10-CM

## 2022-09-22 PROCEDURE — 72148 MRI LUMBAR SPINE W/O DYE: CPT | Mod: GC | Performed by: RADIOLOGY

## 2022-12-15 ENCOUNTER — TRANSCRIBE ORDERS (OUTPATIENT)
Dept: OTHER | Age: 77
End: 2022-12-15

## 2022-12-15 DIAGNOSIS — G89.29 CHRONIC LEFT-SIDED LOW BACK PAIN WITH LEFT-SIDED SCIATICA: Primary | ICD-10-CM

## 2022-12-15 DIAGNOSIS — M54.42 CHRONIC LEFT-SIDED LOW BACK PAIN WITH LEFT-SIDED SCIATICA: Primary | ICD-10-CM

## 2023-01-03 ENCOUNTER — THERAPY VISIT (OUTPATIENT)
Dept: PHYSICAL THERAPY | Facility: CLINIC | Age: 78
End: 2023-01-03
Payer: COMMERCIAL

## 2023-01-03 ENCOUNTER — THERAPY VISIT (OUTPATIENT)
Dept: OCCUPATIONAL THERAPY | Facility: CLINIC | Age: 78
End: 2023-01-03
Payer: COMMERCIAL

## 2023-01-03 DIAGNOSIS — M79.644 PAIN OF FINGER OF RIGHT HAND: ICD-10-CM

## 2023-01-03 DIAGNOSIS — M65.331 TRIGGER MIDDLE FINGER OF RIGHT HAND: ICD-10-CM

## 2023-01-03 DIAGNOSIS — G89.29 CHRONIC LEFT-SIDED LOW BACK PAIN WITH LEFT-SIDED SCIATICA: ICD-10-CM

## 2023-01-03 DIAGNOSIS — M54.42 CHRONIC LEFT-SIDED LOW BACK PAIN WITH LEFT-SIDED SCIATICA: ICD-10-CM

## 2023-01-03 PROBLEM — M65.30 TRIGGER FINGER OF RIGHT HAND: Status: ACTIVE | Noted: 2023-01-03

## 2023-01-03 PROCEDURE — 97161 PT EVAL LOW COMPLEX 20 MIN: CPT | Mod: GP

## 2023-01-03 PROCEDURE — 97165 OT EVAL LOW COMPLEX 30 MIN: CPT | Mod: GO | Performed by: OCCUPATIONAL THERAPIST

## 2023-01-03 PROCEDURE — 97110 THERAPEUTIC EXERCISES: CPT | Mod: GP

## 2023-01-03 PROCEDURE — 97760 ORTHOTIC MGMT&TRAING 1ST ENC: CPT | Mod: GO | Performed by: OCCUPATIONAL THERAPIST

## 2023-01-03 NOTE — PROGRESS NOTES
[facility]    OUTPATIENT Physical Therapy ORTHOPEDIC EVALUATION  PLAN OF TREATMENT FOR OUTPATIENT REHABILITATION  (COMPLETE FOR INITIAL CLAIMS ONLY)  Patient's Last Name, First Name, M.I.  YOB: 1945  Farnaz Alcarazmarisol       Provider s Name:  [facility]   Medical Record No.  [id number]   Start of Care Date:  01/03/23   Onset Date:   12/15/22   Treatment Diagnosis:  LBP Medical Diagnosis:  Chronic left-sided low back pain with left-sided sciatica       Goals:     01/03/23 0500   Body Part   Goals listed below are for LBP   Goal #1   Goal #1 sleeping   Previous Functional Level No restrictions   Current Functional Level 2-3 hours without sleep per night   STG Target Performance 1-2 hours without sleep per night   Rationale to establish restorative sleep pattern   Due Date 02/03/23   LTG Target Performance Less than 1 hour without sleep per night   Rationale to establish restorative sleep pattern   Due Date 03/03/23         Therapy Frequency:  2x/month  Predicted Duration of Therapy Intervention:  2 months    Vimal Mccarthy, PT                 I CERTIFY THE NEED FOR THESE SERVICES FURNISHED UNDER        THIS PLAN OF TREATMENT AND WHILE UNDER MY CARE     (Physician attestation of this document indicates review and certification of the therapy plan).                     Certification Date From:  01/03/23   Certification Date To:  03/03/23    Referring Provider:  Govind Castro    Initial Assessment        See Epic Evaluation SOC Date: 01/03/23

## 2023-01-03 NOTE — PROGRESS NOTES
[facility]    OUTPATIENT Occupational Therapy ORTHOPEDIC EVALUATION  PLAN OF TREATMENT FOR OUTPATIENT REHABILITATION  (COMPLETE FOR INITIAL CLAIMS ONLY)  Patient's Last Name, First Name, M.I.  YOB: 1945  Layla Alcaraz       Provider s Name:  [facility]   Medical Record No.  [id number]   Start of Care Date:  01/03/23   Onset Date:   12/14/23 (Order date)   Treatment Diagnosis:  Right long trigger finger Medical Diagnosis:  Data Unavailable       Goals:     01/03/23 0500   Goal #1   Goal #1 sleeping   Current Functional Task Other - on additional line   Other Sleeping Wakes from sleep with pain and triggering of the long finger.   Current Performance Level Wakes from sleep with pain and triggering of the long finger.   STG Target Performance Other - on additional line   Other Sleeping Wakes 3 days per week without symptoms.   STG Target Perform Level Wakes 3 days per week without symptoms.   Due Date 02/03/23   LTG Target Task/Performance Other - on additional line   Other Sleeping Wakes 6 days per week without symptoms.   Due Date 03/03/23         Therapy Frequency:  2x/month  Predicted Duration of Therapy Intervention:  2 months    Frieda Javed OT                 I CERTIFY THE NEED FOR THESE SERVICES FURNISHED UNDER        THIS PLAN OF TREATMENT AND WHILE UNDER MY CARE .             Physician Signature               Date    X_____________________________________________________                         Certification Date From:  01/03/23   Certification Date To:  03/03/23    Referring Provider:  Govind Castro NP    Initial Assessment        See Epic Evaluation SOC Date: 01/03/23

## 2023-01-03 NOTE — PROGRESS NOTES
"Physical Therapy Initial Evaluation  1/3/2023     Precautions/Restrictions/MD instructions: eval and treat    Therapist Assessment: Layla Alcaraz is a 77 year old male patient presenting to Physical Therapy with leg pain. Patient demonstrates limited lumbar ROM, poor posture, negative radiculopathy signs and questionable lumbar involvement. These impairments limit their ability to sit and sleep without symptoms. Skilled PT services are necessary in order to reduce impairments and improve independent function.    SUBJECTIVE    Chief Complaint: Bilateral Leg pain  Paresthesia (yes/no):  no  Onset date: 1 year ago  Symptoms commenced as a result of: unknown   Location of pain: left thigh, foot  Quality of Pain: \"heat\", \"hot flashes\"   Better: pain ointment  Worse:  Sleeping, sitting  Time of day: worse at night  Progression of Symptoms since onset:  Since onset, these symptoms are stable  Previous treatment: has included none  Previous Functional Status:  fully functional prior to pain onset/injury.        General health as reported by patient: fair.    PMH: see epic   Surgical history/trauma: 2018 L laminectomy and discectomy L4-5  Imaging: see epic  Medications: see epic    Occupation: retired   Current exercise regimen/Recreational activities: follows videos online   Barriers to treatment: language         Patient's Goal(s): understand why he has burning pain    OBJECTIVE    Posture:   Sitting:  Leaning back in chair, posterior pelvic tilted  Standing: decreased lordosis       Movement Loss:   Haider Mod Min Nil Pain   Flexion   x     Extension   x  Increased pain in L buttock/thigh   Side Gliding R   x     Side Gliding L   x       Neural Tension:   Slump: negative B  SLR: negative B  Motor deficit:  Difficulty testing due to language barrier   Sensory deficit:  Intact to light touch       Hip ROM   Hip Flexion Hip ER Hip IR Hip Extension   Left 110 80 10    Right 110 80 20        Flexibility:    Quadriceps " Hamstrings   Left none/WNL trivial   Right none/WNL trivial       Tested Movements  Directional Preference: unclear    ASSESSMENT/PLAN  Patient is a 77 year old male with lumbar complaints.    Patient has the following significant findings with corresponding treatment plan.                Diagnosis 1:  Leg pain  Pain -  hot/cold therapy, manual therapy, splint/taping/bracing/orthotics, self management, education, directional preference exercise and home program  Decreased ROM/flexibility - manual therapy, therapeutic exercise and home program  Decreased strength - therapeutic exercise, therapeutic activities and home program  Impaired posture - neuro re-education and home program    Therapy Evaluation Codes:   1) History comprised of:   Personal factors that impact the plan of care:      Social history/culture.    Comorbidity factors that impact the plan of care are:      None.     Medications impacting care: None.  2) Examination of Body Systems comprised of:   Body structures and functions that impact the plan of care:      Lumbar spine.   Activity limitations that impact the plan of care are:      Sitting, Sleeping and Laying down.  3) Clinical presentation characteristics are:   Stable/Uncomplicated.  4) Decision-Making    Low complexity using standardized patient assessment instrument and/or measureable assessment of functional outcome.  Cumulative Therapy Evaluation is: Low complexity.    Previous and current functional limitations:  (See Goal Flow Sheet for this information)    Short term and Long term goals: (See Goal Flow Sheet for this information)     Communication ability:  Patient appears to be able to clearly communicate and understand verbal and written communication and follow directions correctly.  Treatment Explanation - The following has been discussed with the patient:   RX ordered/plan of care  Anticipated outcomes  Possible risks and side effects  This patient would benefit from PT intervention  to resume normal activities.   Rehab potential is good.    Frequency:  2 X a month, once daily  Duration:  for 2 months  Discharge Plan:  Achieve all LTG.  Independent in home treatment program.  Reach maximal therapeutic benefit.    Please refer to the daily flowsheet for treatment today, total treatment time and time spent performing 1:1 timed codes.

## 2023-01-03 NOTE — PROGRESS NOTES
Hand Therapy Initial Evaluation    Current Date:  1/3/2023    Diagnosis: Right long trigger finger  DOI: 4 months (Order date: 12/14/22)  Referring physician: Govind Castro NP    Precautions: None    Subjective:  Layla Alcaraz is a 77 year old male.    Patient reports symptoms of the right long finger which occurred due to unknown. Patient reports waking with the finger locked in a flexed position. Since onset symptoms are Unchanged  General health as reported by patient is good.  Pertinent medical history includes:  Past Medical History:   Diagnosis Date     Hearing loss      Hyperlipidemia      Lumbar spondylosis      Past Surgical History:   Procedure Laterality Date     COLONOSCOPY       DISCECTOMY LUMBAR MINIMALLY INVASIVE ONE LEVEL Left 2/27/2018    Procedure: DISCECTOMY LUMBAR MINIMALLY INVASIVE ONE LEVEL;;  Surgeon: Elizabeth Buenrostro MD;  Location: [facility] OR     LAMINECTOMY LUMBAR MINIMALLY INVASIVE ONE LEVEL Left 2/27/2018    Procedure: LAMINECTOMY LUMBAR MINIMALLY INVASIVE ONE LEVEL;  Left Minimally Invasive Lumbar 4-5 Hemilaminectomy And Microdiscectomy;  Surgeon: Elizabeth Buenrostro MD;  Location: [facility] OR     Current Outpatient Medications   Medication     aspirin 81 MG chewable tablet     benzonatate (TESSALON) 100 MG capsule     calcium carbonate 600 mg-vitamin D 400 units (CALTRATE) 600-400 MG-UNIT per tablet     FLUOXETINE HCL PO     GABAPENTIN PO     oxyCODONE IR (ROXICODONE) 5 MG tablet     polyethylene glycol (MIRALAX) powder     PRAVASTATIN SODIUM PO     sennosides (SENOKOT) 8.6 MG tablet     tetrahydrozoline (VISINE) 0.05 % ophthalmic solution     No current facility-administered medications for this visit.   No Known Allergies    Occupational Profile Information:  Ambidextrous; uses right hand for writing, uses left hand for utensils and most other activities  Current occupation is retired  Prior functional level:  Independent, lives in own home; has family nearby   Mobility:  No difficulty  Other:     Patient reports symptoms of pain, stiffness, triggering  Special tests:  None    Previous treatment: None      Functional Outcome Measure:   Did not complete due to lack of availability of in-person .    Objective  Pain Level (Scale 0-10)   1/3/2023   At Rest None   With triggering Moderate     Pain Description  Date 1/3/2023   Location long finger, A1 pulley   Pain Quality Sharp   Frequency intermittent     Pain is worst  daytime   Exacerbated by  Triggering   Relieved by Rest   Progression Unchanged     Sensation   WNL throughout all nerve distributions; per patient report    Edema (Circumference measured in cm)   1/3/2023 1/3/2023   Long finger Left Right   P1 6.3 6.5   PIP 6.1 6.3   P2 5.8 5.8     ROM  Long Finger 1/3/2023 1/3/2023   AROM (PROM) Left Right   MCP 0/90 0/90   PIP 0/100 0/90   DIP 0/60 0/75     Stage of Stenosing Tenosynovitis (SST)     1/3/2023   Long Finger Stage 3-4   Stage 1:  Normal  Stage 2:  Uneven motion of tendon  Stage 3:  Triggering, clicking, catching  Stage 4:  Locking in extension or flexion; unlocked by active motion  Stage 5:  Locking in extension or flexion; unlocked by passive motion  Stage 6:  Finger locked in extension or flexion    Strength   (Measured in pounds)  Pain Report:  - none  + mild    ++ moderate    +++ severe    1/3/2023 1/3/2023   Trials Left Right   1 37 30     Palpation  Pain Report:  - none  + mild    ++ moderate    +++ severe   Long Finger 1/3/2023   A1 Pulley +   A3 Pulley/PIP -     Assessment:  Patient presents with symptoms consistent with diagnosis of the above condition, with conservative intervention.     Patient's limitations or Problem List includes:  Pain, Decreased ROM/motion and Triggering of the right long finger which interferes with the patient's ability to perform Self Care Tasks (dressing, eating, bathing) and Household Chores as compared to previous level of function.    Rehab Potential:  Excellent -  Return to full activity, no limitations    Patient will benefit from skilled Occupational Therapy to increase ROM and decrease pain and triggering to return to previous activity level and resume normal daily tasks and to reach their rehab potential.    Barriers to Learning:  Language    Communication Issues:  Patient has an  for communication clarity.    Chart Review: Chart Review    Identified Performance Deficits: bathing/showering, dressing, feeding, hygiene and grooming, home establishment and management, and meal preparation and cleanup.  Assessment of Occupational Performance:  1-3 Performance Deficits    Clinical Decision Making (Complexity): Low complexity    Treatment Explanation:  The following has been discussed with the patient:  RX ordered/plan of care  Anticipated outcomes  Possible risks and side effects    Plan:  Frequency:  2 X a month, once daily  Duration:  for 2 months    Treatment Plan:    Modalities:    Iontophoresis   Therapeutic Exercise:    AROM, AAROM, PROM, Tendon Gliding and Extensor Tracking  Manual Techniques:   Friction massage and Myofascial release  Orthotic Fabrication:    Finger-based trigger finger orthosis      Discharge Plan:  Achieve all LTG.  Independent in home treatment program.  Reach maximal therapeutic benefit.    Home Exercise Program:  Trigger ring orthosis, wear full-time  Massage at A1 pulley  Finger tracking on table top    Next Visit:  US and MFR  Check orthoses

## 2024-07-26 NOTE — PATIENT INSTRUCTIONS
Problem: PAIN - ADULT  Goal: Verbalizes/displays adequate comfort level or baseline comfort level  Description: Interventions:  - Encourage patient to monitor pain and request assistance  - Assess pain using appropriate pain scale  - Administer analgesics based on type and severity of pain and evaluate response  - Implement non-pharmacological measures as appropriate and evaluate response  - Consider cultural and social influences on pain and pain management  - Notify physician/advanced practitioner if interventions unsuccessful or patient reports new pain  Outcome: Progressing     Problem: DISCHARGE PLANNING  Goal: Discharge to home or other facility with appropriate resources  Description: INTERVENTIONS:  - Identify barriers to discharge w/patient and caregiver  - Arrange for needed discharge resources and transportation as appropriate  - Identify discharge learning needs (meds, wound care, etc.)  - Arrange for interpretive services to assist at discharge as needed  - Refer to Case Management Department for coordinating discharge planning if the patient needs post-hospital services based on physician/advanced practitioner order or complex needs related to functional status, cognitive ability, or social support system  Outcome: Progressing     Problem: Knowledge Deficit  Goal: Patient/family/caregiver demonstrates understanding of disease process, treatment plan, medications, and discharge instructions  Description: Complete learning assessment and assess knowledge base.  Interventions:  - Provide teaching at level of understanding  - Provide teaching via preferred learning methods  Outcome: Progressing     Problem: CARDIOVASCULAR - ADULT  Goal: Maintains optimal cardiac output and hemodynamic stability  Description: INTERVENTIONS:  - Monitor I/O, vital signs and rhythm  - Monitor for S/S and trends of decreased cardiac output  - Administer and titrate ordered vasoactive medications to optimize hemodynamic  After Visit Instructions:     Thank you for coming to [facility] for your care. It is my goal to partner with you to help you reach your optimal state of health.     Continue daily self-care, identifying contributing factors, and monitoring variations in pain level. Continue to integrate self-care into your life.      1. Schedule follow-up with OMAR Pavon NP-C in 1 week after neurosurgery appointment. Please call to make appt with Emani as soon as you have the date for your neurosurgery appt.   2. Procedures recommended: We will do an steroid injection for your pain if neurosurgery says it's ok.     OMAR Otero NP-C  [facility]    Clinic Number:  [phone]     Call with any questions about your care and for scheduling assistance.     Calls are returned Monday through Friday between 8 AM and 4:30 PM. We usually get back to you within 2 business days depending on the issue/request.    If we are prescribing your medications:    For opioid medication refills, call the clinic or send a [employer] message 7 days in advance.  Please include:    Name of requested medication    Name of the pharmacy.    For non-opioid medications, call your pharmacy directly to request a refill. Please allow 3-4 days to be processed.     Per [place] State Law:    All controlled substance prescriptions must be filled within 30 days of being written.      For those controlled substances allowing refills, pickup must occur within 30 days of last fill.      We believe regular attendance is key to your success in our program!      Any time you are unable to keep your appointment we ask that you call us at least 24 hours in advance to cancel.This will allow us to offer the appointment time to another patient.   Multiple missed appointments may lead to dismissal from the clinic.          stability  - Assess quality of pulses, skin color and temperature  - Assess for signs of decreased coronary artery perfusion  - Instruct patient to report change in severity of symptoms  Outcome: Progressing

## 2025-01-30 NOTE — PROGRESS NOTES
Is This A New Presentation, Or A Follow-Up?: Phototherapy Treatment Layla Alcaraz is a 74 year old male     This patient is being seen in consultation at the request of his primary care provider  Dr. Torres at [facility], for evaluation of his pain issues and recommendations for management, with specific emphasis on: LLE radiculopathy    Primary Care Provider is Emani Torres    Current controlled substance medications are being prescribed by: N/A    Please see the [facility] health questionnaire which the patient completed and reviewed with me in detail    CHIEF COMPLAINT:  Back and leg pain    HISTORY OF PRESENT ILLNESS:  Layla Alcaraz is a 74 year old male with history of low back pain with left lower extremity radiculopathy      Pain Information:   Onset/Progression:  Had lumbar surgery 2/27/2018. Axial pain was relieved but radiculopathy never improved. Worse when he lays down. Changing positions does not help. Has radiculopathy to left mid calf.    Pain quality: Aching    Pain timing: Intermittent     Pain rating: intensity ranges from 0/10 to 10/10, and averages 5/10 on a 0-10 scale.   Aggravating factors include: Comes on regardless of activity   Relieving factors include: Just rubs the leg. Does not always help    Past Pain Treatments:    Pain Clinic: No     PT: No     Psychologist: No   Relaxation techniques/biofeedback: No   Chiropractor: No   Acupuncture: No   Pharmacotherapy:     Opioids: Yes      Non-opioids:  Yes    TENs Unit:No   Injections: No    Self-care:   Yes    Surgeries related to pain: Yes:     2/27/2018: Left Minimally Invasive Lumbar 4-5 Hemilaminectomy And Microdiscectomy    Current Pain Relevant Medications:    Tylenol 500 mg (?) intermittent use  Gabapentin 800 mg at HS     Previous Pain Relevant Medications: (H--helped; HI--Helped initially; [facility]--Somewhat helpful; [facility]--No help; W--worse; SE--side effects; ?--Unsure if helpful)   NOTE: This medication information taken from patient's intake form, not medical records.     Opiates: Oxycodone:H (Post op) H   NSAIDS: none     Muscle Relaxants: none    Anti-migraine mediations: none   Anti-depressants: Prozac:H    Sleep aids:none   Anxiolytics: none   Neuropathics: Gabapentin:[facility]     Topicals: OTC cream, unsure of name (?)    Other medications not covered above: Tylenol:[facility]       Any illicit drug use: none  EtOH use: none  Caffeine use: 3-5 daily   Nicotine use: none   Any use of prescriptions other than how they were prescribed:none      THE 4 As OF OPIOID MAINTENANCE ANALGESIA    Analgesia: Is pain relief clinically significant? N/A   Activity: Is patient functional and able to perform Activities of Daily Living? N/A   Adverse effects: Is patient free from adverse side effects from opiates? N/A   Adherence to Rx protocol: Is patient adhering to Controlled Substance Agreement and taking medications ONLY as ordered? N/A    Is Narcan prescribed for opiate use >50 MME daily? N/A    [place] Board of Pharmacy Data Base Reviewed:    YES; No concern for abuse or misuse of controlled medications based on this report.       PAST MEDICAL HISTORY:   Past Medical History:   Diagnosis Date     Hearing loss      Hyperlipidemia      Lumbar spondylosis          CURRENT FAMILY/SOCIAL SITUATION:  Living situation: lives alone, in an apt, denies difficulty caring for his home.   Support system: Family   Occupation: Retired   Current stressors: pain   Safety concerns: denies     FAMILY MEDICAL HISTORY:  Chronic pain: Unknown   Family history of headaches: Unknown     HEALTH & LIFESTYLE PRACTICES:  Social History     Socioeconomic History     Marital status: Single     Spouse name: Not on file     Number of children: Not on file     Years of education: Not on file     Highest education level: Not on file   Occupational History     Not on file   Social Needs     Financial resource strain: Not on file     Food insecurity:     Worry: Not on file     Inability: Not on file     Transportation needs:      Medical: Not on file     Non-medical: Not on file   Tobacco Use     Smoking status: Former Smoker     Packs/day: 1.00     Years: 30.00     Pack years: 30.00     Types: Cigarettes     Last attempt to quit: 2003     Years since quittin.8     Smokeless tobacco: Never Used   Substance and Sexual Activity     Alcohol use: Yes     Alcohol/week: 0.0 standard drinks     Comment: occasional     Drug use: No     Sexual activity: Not Currently   Lifestyle     Physical activity:     Days per week: Not on file     Minutes per session: Not on file     Stress: Not on file   Relationships     Social connections:     Talks on phone: Not on file     Gets together: Not on file     Attends Mu-ism service: Not on file     Active member of club or organization: Not on file     Attends meetings of clubs or organizations: Not on file     Relationship status: Not on file     Intimate partner violence:     Fear of current or ex partner: Not on file     Emotionally abused: Not on file     Physically abused: Not on file     Forced sexual activity: Not on file   Other Topics Concern     Parent/sibling w/ CABG, MI or angioplasty before 65F 55M? Not Asked   Social History Narrative     Not on file       ALLERGIES:  No Known Allergies    MEDICATIONS:  Current Outpatient Medications   Medication Sig Dispense Refill     aspirin 81 MG chewable tablet Take 1 tablet (81 mg) by mouth daily 60 tablet 11     FLUOXETINE HCL PO Take 40 mg by mouth daily       GABAPENTIN PO Take 800 mg by mouth 4 times daily        PRAVASTATIN SODIUM PO Take 80 mg by mouth daily        sennosides (SENOKOT) 8.6 MG tablet Take 1 tablet by mouth daily       oxyCODONE IR (ROXICODONE) 5 MG tablet Take 1 tablet (5 mg) by mouth every 6 hours as needed for pain (One tablet every 6 -8 hours for moderate pain.) maximum 10 tablet(s) per day (Patient not taking: Reported on 2019) 18 tablet 0     oxyCODONE IR (ROXICODONE) 5 MG tablet Take 1 tablet (5 mg) by mouth every  6 hours as needed for moderate to severe pain (pain control or improvement in physical function. ) (Patient not taking: Reported on 11/12/2019) 45 tablet 0     polyethylene glycol (MIRALAX) powder Take 17 g (1 capful) by mouth daily (Patient not taking: Reported on 11/12/2019) 510 g 1     REVIEW OF SYSTEMS:   Constitutional:  Negative  Eyes/Head: Negative  Ears/Nose/Throat: Negative  Allergy/Immune: Negative  Skin:Negative  Hematologic/Lymphatic/Immunologic:Negative  Respiratory: Negative  Cardiovascular: Negative  Gastrointestinal: Constipation, treated   Endocrine: Negative  Musculoskeletal: Back pain and LLE radiculopathy   Urinary:  Negative   Any bowel or bladder incontinence: Denies   Neurologic: Numbness/Tingling in LLE   Psychiatric: Negative    PHYSICAL EXAM    /75   Pulse 73      Appearance:     A&O. Patient is appropriate.   Patient is in NAD.     HEENT:   Normocephalic, atraumatic, sclera, conjunctiva and pharynx normal. Pupils are equal, round. Hearing is adequate for exam   Neck: No deformities or adenopathy  Cardiovascular:  No JVD appreciated. No edema on bilateral lower extremities.   Skin:  No rashes, erythema, breakdowns, lesions to exposed skin.   Hematologic:  No bruises, petechiae or ecchymosis to exposed areas.  Musculoskeletal:  Posture upright, shoulders and pelvis are leveled.   Deltoid: R: 5/5 L: 5/5  Biceps: R: 5/5 L: 5/5  Triceps: R: 5/5 L: 5/5  Intrinsic hand:R: 5/5 L: 5/5  Hip flexion: R: 5/5 L: 5/5  Knee ext: R: 5/5 L: 5/5  Knee flex: R: 5/5 L: 5/5  Dorsiflexion: R: 5/5 L: 5/5  Plantarflexion:R: 5/5 L: 5/5    Gait pattern:  Able to walk on the heels and toes.  No antalgic gait noted.    Neurological:   Deep Tendon Reflex exam:   Biceps:     R:  2/4   L: 2/4   Brachioradialis   R:  2/4   L: 2/4:   Triceps:  R:  2/4   L: 2/4   Patella:  R:  2/4   L: 2/4   Achilles:  R:  2/4   L: 2/4    Sensory exam:   Light touch: normal bilateral upper and lower extremities    Vibration: normal  in LE   No allodynia, dysesthesia, or hyperalgesia.    Cervical spine: Patient declines cervical exam  Thoracic spine:    Kyphosis.  Mild   Tenderness in the thoracic spine at midline. No   Tenderness in the thoracic paraspinal muscles. No  Lumbar/Sacral spine:   Forward Flexion:  90 degrees, pain free   Ext: 30 degrees, pain free   Rotation/ext to right: pain free   Rotation/ext to left: pain free   Lordosis. No   Tenderness in the lumbar spine at midline. No   Tenderness in the lumbar paraspinal muscles.No   Straight leg exam:    Right: negative     Left:  negative   Joan/Carlos's test:      Right: negative     Left:  negative   Passive internal rotation:     Right: negative     Left: negative    Active external rotation:      Right: negative     Left: negative   Tenderness over SI joint:      Right: negative     Left:  negative   Tenderness over piriformis:     Right: negative     Left:  negative   Tenderness over Trochanteric Bursa:     Right: negative     Left: negative     Psychiatric:  mentation appears normal., affect and mood normal    DIRE Score for ongoing opioid management is calculated as follows:    Diagnosis = 2 pts (slowly progressive; moderate pain/objective findings)    Intractability = 1 pt (few therapies tried; passive patient role)    Risk        Psych = 3 pts (no significant personality dysfunction/mental illness; good communication with clinic)         Chem Hlth = 3 pts (no history of chemical dependency; not drug-focused)       Reliability = 3 pts (highly reliable with meds, appointments, treatments)       Social = 2 pts (reduction in some relationships/life rolls)       (Psych + Chem hlth + Reliability + Social) = 14    Efficacy = 2 pts (moderate benefit/function; low med dose; too early/not tried meds)    DIRE Score = 16       7-13: likely NOT suitable candidate for long-term opioid analgesia       14-21: may be a suitable candidate for long-term opioid analgesia    Previous Diagnostic  Tests:   Imaging Studies:   MR CERVICAL SPINE W/O & W CONTRAST, MR THORACIC SPINE W/O  & W CONTRAST, MR LUMBAR SPINE W/O & W CONTRAST 9/19/2017  6:23 PM  History:  Anesthesia of skin, Paresthesia of skin, Unspecified urinary incontinence, Radiculopathy, site unspecified left upper and lower extremity numbness.  Comparison`: None.  Findings:   Regarding numbering convention, there are 7 cervical, 12 thoracic and  5 lumbar-type vertebra.  Cervical:  There is straightening of the normal cervical lordosis.  Approximately 4 mm of anterolisthesis of C4 on C5, 3 mm of  retrolisthesis of C5 on C6, 3 mm of retrolisthesis of C6 on C7 and 3  mm of anterolisthesis of C7 on T1. There are degenerative endplate  changes of C5-C6. There is multilevel disc space narrowing and disc  desiccation, most pronounced at C5-C6. There is no abnormal signal  within the cervical spinal cord.  On a level by level basis:     C2-3: No neuroforaminal or spinal canal stenosis.  C3-4: There is a central disc extrusion, which is directed inferiorly.  Facet arthropathy and uncinate hypertrophy causes mild right and  moderate left neural foraminal narrowing. Mild spinal canal narrowing.  C4-5: Uncovering of the disc and mild base disc bulge. Facet  arthropathy and uncinate hypertrophy causes mild neural foraminal  narrowing bilaterally. Mild spinal canal narrowing.   C5-6: Central disc protrusion superimposed upon a mild broad-based  disc osteophyte complex effaces the ventral thecal sac and abuts and  flattens the spinal cord. Facet arthropathy and uncinate hypertrophy.  Moderate neural foraminal narrowing bilaterally. Moderate spinal canal  narrowing.  C6-7: Mild disc bulge. Facet arthropathy and ligamentum flavum  hypertrophy. Mild facet arthropathy and uncinate hypertrophy. Mild  neural foraminal narrowing and mild spinal canal narrowing.  C7-T1: There is uncovering of the disc with a superimposed small disc  bulge with facet arthropathy and  uncinate hypertrophy. The right facet  neural foramen is patent. Mild left neural foraminal narrowing. Mild  spinal canal narrowing. Osseous and periarticular edema with  enhancement associated with the left facet joint.     No abnormal signal within the spinal canal or spinal cord.      Thoracic: Approximately 3 mm anterolisthesis of T5 on T6. There is  mild dextroconvexity of the lower thoracic spine. Multilevel disc  space narrowing and disc desiccation, most pronounced at T10-11.   There are mild disc bulges at T7-8 and T10-12. Focal fat deposition  versus benign hemangioma in the right T3 vertebral body. There is a  small focus of abnormal T2 hyperintensity within the spinal cord  centrally at T10-11.   No abnormal enhancement of the paraspinous  tissues or within the spinal canal. There is a tiny lesion at the left  dorsal aspect of the T10 vertebral body in the inferior endplate,  which T1 hypointense, T2 hyperintense does not saturate on STIR and  enhances on postcontrast imaging. This is slightly larger than on the  chest CT from 2/11/2015.     T1-2: Right eccentric broad-based disc bulge effaces the ventral  thecal sac and abuts the spinal cord greater on the right versus the  left. Moderate neural foraminal narrowing bilaterally. Mild to  moderate spinal canal narrowing.  T9-10: Small broad-based disc bulge with a superimposed right  paramedian disc protrusion causing mild spinal canal narrowing. The  neural foramina are patent.  T10-11: Broad-based disc osteophyte complex effaces the ventral thecal  sac and abuts the spinal cord. There is a short segment of T2  hyperintensity within the central cord at this level. Mild right and  moderate left neural foraminal narrowing. Moderate spinal canal  narrowing.     Lumbar: Levoconvexity of the lumbar spine centered at L1-L2. The tip  of the conus medullaris is at L1.. There is 3 mm of retrolisthesis of  L2 and L3, 3 mm of retrolisthesis of L3 on L4 and 3 mm  of  anterolisthesis of L5 on S1.  Modic 2 endplate changes at L2-L3.  Multilevel disc space narrowing and disc desiccation, most pronounced  at L2-L3 on the right. Bone marrow signal intensity appears normal..  There is no disc height narrowing.      On a level by level basis:     T12-L1: Disc bulge partially effaces the ventral thecal sac. Facet  arthropathy and ligamentum flavum hypertrophy. Neural foramen are  patent. Mild to moderate spinal canal narrowing.      L1-2: Mild disc bulge, facet arthropathy and ligamentum flavum  hypertrophy. Mild to moderate neural foraminal narrowing bilaterally.  Mild spinal canal narrowing.     L2-3: Mild disc bulge, which impinges upon the traversing L2 spinal  nerve in the right lateral recess. Facet arthropathy and ligamentum  flavum hypertrophy. Mild to moderate left neural foraminal narrowing.  Mild right neural foraminal narrowing. Mild to moderate spinal canal  narrowing.     L3-4: Mild disc bulge. Facet arthropathy and ligamentum flavum  hypertrophy. Moderate left neural foraminal narrowing, mild right  neural foraminal narrowing. Mild spinal canal narrowing.     L4-5: Mild disc bulge, facet arthropathy and ligamentum flavum  hypertrophy. Moderate left neural foraminal narrowing and mild right  neural foraminal narrowing. Moderate to severe spinal canal narrowing.     L5-S1: Mild disc bulge, facet arthropathy and ligamentum flavum  hypertrophy. Severe left neural foraminal narrowing, mild right neural  foraminal narrowing.      Facet enhancement at L4-5 and L5-S1 is likely inflammatory due facet  arthropathy.  No abnormal enhancement of the vertebral column, or  within the spinal canal.                                                                      Impression:   1. Multilevel cervical spondylosis most pronounced at C5-6 with  moderate neural foraminal narrowing bilaterally and moderate spinal  canal narrowing.  2. Multilevel thoracic spondylosis, most pronounced at  T10-11 where  there is moderate spinal canal narrowing and moderate neural foraminal  narrowing bilaterally. Small focus of myelomalacia in the central  cord.  3. Multilevel lumbar spondylosis with levoconvex scoliosis, most  pronounced at L4-5 with moderate left neural foraminal narrowing and  moderate to severe spinal canal stenosis.  4. Active inflammatory arthropathy involving the facet joints of the  lower lumbar spine and lower cervical spine.  5. Small round focus of enhancement in the left posterior vertebral  body lower endplate of T10. Although this has slightly enlarged since  2015, this most likely represents a vascular Schmorl's node,  particularly absent a history of primary malignancy.    ASSESSMENT:   1.  Chronic low back pain with LLE radiculopathy s/p L4-5 hemilaminectomy/discectomy    Layla Alcaraz is a very pleasant man who presents in the company of a [employer] .  As noted above, February 2018 he had lumbar surgery which completely relieved his axial pain however he still has severe radiculopathy to the left mid thigh/knee.  He states these attacks come on suddenly and not relation to any particular activity or movement.  His exam is fairly benign and he is quite flexible and strong particularly given his age.  He does not have any difficulty functioning except during these attacks which can come on briefly for up to several times per day.  We do not have any recent imaging of his low back and none that I can find postoperatively.  We will get an MRI of the lumbar spine and then determine if there are any potential interventional procedures which would be beneficial to him.  Per the referral to his primary care provider, Emani Torres MD, he is not interested in surgery.  In the meantime we will not make any changes to his current medications.  However, we may consider adding gabapentin during the day if he tolerates that to help with radiculopathy.  We will need clearance from  his primary care provider and I would like to run a comprehensive metabolic panel before making medication decisions.    PLAN:    Diagnosis reviewed, treatment option addressed, and risk/benefits discussed.  Self-care instructions given.  I am recommending a multidisciplinary treatment plan to help this patient better manage pain.      1. Schedule follow-up with OMAR Pavon, NP-C a few days after MRI   2. Imaging: MRI of lumbar spine  3. Procedures recommended: We will discuss options after MRI   4. Medication recommendations:   1. No changes to medications.     TIME SPENT:     A total of 60 minutes was spent on the patient today, greater than 50% of that time was spent on face to face counseling and care coordination regarding diagnoses and treatment options as mentioned above including the multidisciplinary pain management program and the benefits of interventional procedures (as appropriate), medication management, physical therapy and pain psychology.        I would like to thank Dr. Torres for allowing me to participate in the management of this patient.     OMAR Otero, NPJhonnyC  [facility]    Disclaimer: This note consists of symbols derived from keyboarding, dictation and/or voice recognition software. As a result, there may be errors in the script that have gone undetected. Please consider this when interpreting information found in this chart.

## (undated) DEVICE — BUR MATCHSTICK 3MM ANSPACH L-8NS-G1

## (undated) DEVICE — DRAPE C-ARMOR 5 SIDED 5523

## (undated) DEVICE — DRAPE STERI TOWEL LG 1010

## (undated) DEVICE — SU VICRYL 4-0 PS-2 18" UND J496H

## (undated) DEVICE — NDL SPINAL 18GA 3.5" 405184

## (undated) DEVICE — CATH TRAY FOLEY SURESTEP 16FR W/URNE MTR STLK LATEX A303316A

## (undated) DEVICE — PREP CHLORAPREP CLEAR 3ML 260400

## (undated) DEVICE — PREP CHLORAPREP 26ML TINTED ORANGE  260815

## (undated) DEVICE — SOL NACL 0.9% IRRIG 1000ML BOTTLE 2F7124

## (undated) DEVICE — KNIFE MEDT BAYONETED DISCECTOMY 134MM 1564-00

## (undated) DEVICE — GLOVE PROTEXIS BLUE W/NEU-THERA 7.5  2D73EB75

## (undated) DEVICE — SPONGE RAY-TEC 4X4" 7317

## (undated) DEVICE — SUCTION MANIFOLD DORNOCH ULTRA CART UL-CL500

## (undated) DEVICE — SYR 30ML LL W/O NDL 302832

## (undated) DEVICE — SU ETHILON 3-0 PS-1 18" 1663H

## (undated) DEVICE — DRAPE MICROSCOPE LEICA 54X150" AR8033650

## (undated) DEVICE — ESU ELEC BLADE 6" COATED/INSULATED E1455-6

## (undated) DEVICE — SPONGE COTTONOID 1/2X1/2" 20-04S

## (undated) DEVICE — NDL BLUNT 17GA 1.5" 8881202330

## (undated) DEVICE — BUR MATCHSTICK 3.0MM FLUTED 15CM ANSPACH MA15-8NS

## (undated) DEVICE — SU VICRYL 0 UR-6 27" J603H

## (undated) DEVICE — WIPES FOLEY CARE SURESTEP PROVON DFC100

## (undated) DEVICE — LINEN TOWEL PACK X6 WHITE 5487

## (undated) DEVICE — SU VICRYL 2-0 CT-2 CR 8X18" J726D

## (undated) DEVICE — GLOVE PROTEXIS POWDER FREE 7.0 ORTHOPEDIC 2D73ET70

## (undated) DEVICE — PACK NEURO MINOR UMMC SNE32MNMU4

## (undated) DEVICE — LINEN TOWEL PACK X5 5464

## (undated) DEVICE — ESU ELEC BLADE 2.75" COATED/INSULATED E1455

## (undated) DEVICE — SPONGE SURGIFOAM 100 1974

## (undated) DEVICE — SU DERMABOND ADVANCED .7ML DNX12

## (undated) RX ORDER — GLYCOPYRROLATE 0.2 MG/ML
INJECTION, SOLUTION INTRAMUSCULAR; INTRAVENOUS
Status: DISPENSED
Start: 2018-02-27

## (undated) RX ORDER — PROPOFOL 10 MG/ML
INJECTION, EMULSION INTRAVENOUS
Status: DISPENSED
Start: 2018-02-27

## (undated) RX ORDER — ONDANSETRON 2 MG/ML
INJECTION INTRAMUSCULAR; INTRAVENOUS
Status: DISPENSED
Start: 2018-02-27

## (undated) RX ORDER — CEFAZOLIN SODIUM 2 G/100ML
INJECTION, SOLUTION INTRAVENOUS
Status: DISPENSED
Start: 2018-02-27

## (undated) RX ORDER — FENTANYL CITRATE 50 UG/ML
INJECTION, SOLUTION INTRAMUSCULAR; INTRAVENOUS
Status: DISPENSED
Start: 2018-02-27

## (undated) RX ORDER — LIDOCAINE HYDROCHLORIDE 20 MG/ML
INJECTION, SOLUTION EPIDURAL; INFILTRATION; INTRACAUDAL; PERINEURAL
Status: DISPENSED
Start: 2018-02-27

## (undated) RX ORDER — EPHEDRINE SULFATE 50 MG/ML
INJECTION, SOLUTION INTRAMUSCULAR; INTRAVENOUS; SUBCUTANEOUS
Status: DISPENSED
Start: 2018-02-27

## (undated) RX ORDER — BACITRACIN 50000 [IU]/1
INJECTION, POWDER, FOR SOLUTION INTRAMUSCULAR
Status: DISPENSED
Start: 2018-02-27

## (undated) RX ORDER — PHENYLEPHRINE HCL IN 0.9% NACL 1 MG/10 ML
SYRINGE (ML) INTRAVENOUS
Status: DISPENSED
Start: 2018-02-27

## (undated) RX ORDER — DEXAMETHASONE SODIUM PHOSPHATE 4 MG/ML
INJECTION, SOLUTION INTRA-ARTICULAR; INTRALESIONAL; INTRAMUSCULAR; INTRAVENOUS; SOFT TISSUE
Status: DISPENSED
Start: 2018-02-27